# Patient Record
Sex: FEMALE | Race: WHITE | NOT HISPANIC OR LATINO
[De-identification: names, ages, dates, MRNs, and addresses within clinical notes are randomized per-mention and may not be internally consistent; named-entity substitution may affect disease eponyms.]

---

## 2017-06-06 ENCOUNTER — APPOINTMENT (OUTPATIENT)
Dept: OBGYN | Facility: CLINIC | Age: 82
End: 2017-06-06

## 2017-06-06 VITALS
BODY MASS INDEX: 28.17 KG/M2 | DIASTOLIC BLOOD PRESSURE: 86 MMHG | HEIGHT: 64 IN | WEIGHT: 165 LBS | HEART RATE: 76 BPM | SYSTOLIC BLOOD PRESSURE: 146 MMHG

## 2017-06-06 DIAGNOSIS — Z86.39 PERSONAL HISTORY OF OTHER ENDOCRINE, NUTRITIONAL AND METABOLIC DISEASE: ICD-10-CM

## 2017-06-06 DIAGNOSIS — R35.0 FREQUENCY OF MICTURITION: ICD-10-CM

## 2017-06-06 DIAGNOSIS — Z78.9 OTHER SPECIFIED HEALTH STATUS: ICD-10-CM

## 2017-06-07 LAB
BILIRUB UR QL STRIP: NEGATIVE
GLUCOSE UR-MCNC: NEGATIVE
HCG UR QL: 0.2 EU/DL
HGB UR QL STRIP.AUTO: NORMAL
KETONES UR-MCNC: NEGATIVE
LEUKOCYTE ESTERASE UR QL STRIP: NORMAL
NITRITE UR QL STRIP: NEGATIVE
PH UR STRIP: 5.5
PROT UR STRIP-MCNC: NEGATIVE
SP GR UR STRIP: 1.02

## 2017-06-09 LAB — BACTERIA UR CULT: NORMAL

## 2017-10-02 ENCOUNTER — APPOINTMENT (OUTPATIENT)
Dept: DERMATOLOGY | Facility: CLINIC | Age: 82
End: 2017-10-02

## 2017-10-13 ENCOUNTER — APPOINTMENT (OUTPATIENT)
Dept: ORTHOPEDIC SURGERY | Facility: CLINIC | Age: 82
End: 2017-10-13
Payer: MEDICARE

## 2017-10-13 VITALS
SYSTOLIC BLOOD PRESSURE: 171 MMHG | DIASTOLIC BLOOD PRESSURE: 97 MMHG | BODY MASS INDEX: 28.17 KG/M2 | HEART RATE: 72 BPM | HEIGHT: 64 IN | WEIGHT: 165 LBS

## 2017-10-13 DIAGNOSIS — Z96.651 PRESENCE OF RIGHT ARTIFICIAL KNEE JOINT: ICD-10-CM

## 2017-10-13 PROCEDURE — 73562 X-RAY EXAM OF KNEE 3: CPT | Mod: 50

## 2017-10-13 PROCEDURE — 99204 OFFICE O/P NEW MOD 45 MIN: CPT

## 2018-06-07 ENCOUNTER — RESULT REVIEW (OUTPATIENT)
Age: 83
End: 2018-06-07

## 2018-06-08 ENCOUNTER — APPOINTMENT (OUTPATIENT)
Dept: DERMATOLOGY | Facility: CLINIC | Age: 83
End: 2018-06-08
Payer: MEDICARE

## 2018-06-08 PROCEDURE — 12031 INTMD RPR S/A/T/EXT 2.5 CM/<: CPT

## 2018-06-08 PROCEDURE — 11602 EXC TR-EXT MAL+MARG 1.1-2 CM: CPT

## 2018-06-08 PROCEDURE — 99202 OFFICE O/P NEW SF 15 MIN: CPT | Mod: 25

## 2018-06-11 ENCOUNTER — APPOINTMENT (OUTPATIENT)
Dept: DERMATOLOGY | Facility: CLINIC | Age: 83
End: 2018-06-11
Payer: MEDICARE

## 2018-06-11 PROCEDURE — ZZZZZ: CPT

## 2018-06-14 ENCOUNTER — APPOINTMENT (OUTPATIENT)
Dept: DERMATOLOGY | Facility: CLINIC | Age: 83
End: 2018-06-14
Payer: MEDICARE

## 2018-06-14 PROCEDURE — ZZZZZ: CPT

## 2018-06-18 ENCOUNTER — APPOINTMENT (OUTPATIENT)
Dept: DERMATOLOGY | Facility: CLINIC | Age: 83
End: 2018-06-18
Payer: MEDICARE

## 2018-06-18 PROCEDURE — ZZZZZ: CPT

## 2018-09-07 ENCOUNTER — APPOINTMENT (OUTPATIENT)
Dept: ORTHOPEDIC SURGERY | Facility: CLINIC | Age: 83
End: 2018-09-07
Payer: MEDICARE

## 2018-09-07 VITALS
HEIGHT: 64 IN | HEART RATE: 82 BPM | SYSTOLIC BLOOD PRESSURE: 168 MMHG | WEIGHT: 165 LBS | BODY MASS INDEX: 28.17 KG/M2 | DIASTOLIC BLOOD PRESSURE: 89 MMHG

## 2018-09-07 DIAGNOSIS — M25.562 PAIN IN LEFT KNEE: ICD-10-CM

## 2018-09-07 PROCEDURE — 73562 X-RAY EXAM OF KNEE 3: CPT | Mod: LT

## 2018-09-07 PROCEDURE — 99214 OFFICE O/P EST MOD 30 MIN: CPT

## 2018-11-16 ENCOUNTER — OUTPATIENT (OUTPATIENT)
Dept: OUTPATIENT SERVICES | Facility: HOSPITAL | Age: 83
LOS: 1 days | End: 2018-11-16
Payer: MEDICARE

## 2018-11-16 VITALS
RESPIRATION RATE: 15 BRPM | WEIGHT: 151.02 LBS | TEMPERATURE: 98 F | DIASTOLIC BLOOD PRESSURE: 80 MMHG | HEART RATE: 80 BPM | HEIGHT: 66 IN | OXYGEN SATURATION: 96 % | SYSTOLIC BLOOD PRESSURE: 130 MMHG

## 2018-11-16 DIAGNOSIS — D27.9 BENIGN NEOPLASM OF UNSPECIFIED OVARY: Chronic | ICD-10-CM

## 2018-11-16 DIAGNOSIS — M17.12 UNILATERAL PRIMARY OSTEOARTHRITIS, LEFT KNEE: ICD-10-CM

## 2018-11-16 DIAGNOSIS — Z96.659 PRESENCE OF UNSPECIFIED ARTIFICIAL KNEE JOINT: Chronic | ICD-10-CM

## 2018-11-16 DIAGNOSIS — M25.562 PAIN IN LEFT KNEE: ICD-10-CM

## 2018-11-16 DIAGNOSIS — R68.89 OTHER GENERAL SYMPTOMS AND SIGNS: ICD-10-CM

## 2018-11-16 DIAGNOSIS — M40.209 UNSPECIFIED KYPHOSIS, SITE UNSPECIFIED: ICD-10-CM

## 2018-11-16 DIAGNOSIS — Z01.818 ENCOUNTER FOR OTHER PREPROCEDURAL EXAMINATION: ICD-10-CM

## 2018-11-16 LAB
ALBUMIN SERPL ELPH-MCNC: 3.7 G/DL — SIGNIFICANT CHANGE UP (ref 3.3–5)
ALP SERPL-CCNC: 84 U/L — SIGNIFICANT CHANGE UP (ref 30–120)
ALT FLD-CCNC: 26 U/L DA — SIGNIFICANT CHANGE UP (ref 10–60)
ANION GAP SERPL CALC-SCNC: 8 MMOL/L — SIGNIFICANT CHANGE UP (ref 5–17)
APTT BLD: 29.6 SEC — SIGNIFICANT CHANGE UP (ref 28.5–37)
AST SERPL-CCNC: 21 U/L — SIGNIFICANT CHANGE UP (ref 10–40)
BILIRUB SERPL-MCNC: 0.6 MG/DL — SIGNIFICANT CHANGE UP (ref 0.2–1.2)
BUN SERPL-MCNC: 12 MG/DL — SIGNIFICANT CHANGE UP (ref 7–23)
CALCIUM SERPL-MCNC: 9.1 MG/DL — SIGNIFICANT CHANGE UP (ref 8.4–10.5)
CHLORIDE SERPL-SCNC: 105 MMOL/L — SIGNIFICANT CHANGE UP (ref 96–108)
CO2 SERPL-SCNC: 30 MMOL/L — SIGNIFICANT CHANGE UP (ref 22–31)
CREAT SERPL-MCNC: 0.65 MG/DL — SIGNIFICANT CHANGE UP (ref 0.5–1.3)
GLUCOSE SERPL-MCNC: 100 MG/DL — HIGH (ref 70–99)
HCT VFR BLD CALC: 42.5 % — SIGNIFICANT CHANGE UP (ref 34.5–45)
HGB BLD-MCNC: 13.6 G/DL — SIGNIFICANT CHANGE UP (ref 11.5–15.5)
INR BLD: 1.07 RATIO — SIGNIFICANT CHANGE UP (ref 0.88–1.16)
MCHC RBC-ENTMCNC: 28.9 PG — SIGNIFICANT CHANGE UP (ref 27–34)
MCHC RBC-ENTMCNC: 32 GM/DL — SIGNIFICANT CHANGE UP (ref 32–36)
MCV RBC AUTO: 90.2 FL — SIGNIFICANT CHANGE UP (ref 80–100)
MRSA PCR RESULT.: SIGNIFICANT CHANGE UP
NRBC # BLD: 0 /100 WBCS — SIGNIFICANT CHANGE UP (ref 0–0)
PLATELET # BLD AUTO: 185 K/UL — SIGNIFICANT CHANGE UP (ref 150–400)
POTASSIUM SERPL-MCNC: 3.9 MMOL/L — SIGNIFICANT CHANGE UP (ref 3.5–5.3)
POTASSIUM SERPL-SCNC: 3.9 MMOL/L — SIGNIFICANT CHANGE UP (ref 3.5–5.3)
PROT SERPL-MCNC: 7.3 G/DL — SIGNIFICANT CHANGE UP (ref 6–8.3)
PROTHROM AB SERPL-ACNC: 11.7 SEC — SIGNIFICANT CHANGE UP (ref 10–12.9)
RBC # BLD: 4.71 M/UL — SIGNIFICANT CHANGE UP (ref 3.8–5.2)
RBC # FLD: 13.7 % — SIGNIFICANT CHANGE UP (ref 10.3–14.5)
S AUREUS DNA NOSE QL NAA+PROBE: SIGNIFICANT CHANGE UP
SODIUM SERPL-SCNC: 143 MMOL/L — SIGNIFICANT CHANGE UP (ref 135–145)
WBC # BLD: 6.12 K/UL — SIGNIFICANT CHANGE UP (ref 3.8–10.5)
WBC # FLD AUTO: 6.12 K/UL — SIGNIFICANT CHANGE UP (ref 3.8–10.5)

## 2018-11-16 PROCEDURE — 36415 COLL VENOUS BLD VENIPUNCTURE: CPT

## 2018-11-16 PROCEDURE — 86901 BLOOD TYPING SEROLOGIC RH(D): CPT

## 2018-11-16 PROCEDURE — 85610 PROTHROMBIN TIME: CPT

## 2018-11-16 PROCEDURE — 87640 STAPH A DNA AMP PROBE: CPT

## 2018-11-16 PROCEDURE — G0463: CPT

## 2018-11-16 PROCEDURE — 86850 RBC ANTIBODY SCREEN: CPT

## 2018-11-16 PROCEDURE — 85730 THROMBOPLASTIN TIME PARTIAL: CPT

## 2018-11-16 PROCEDURE — 85027 COMPLETE CBC AUTOMATED: CPT

## 2018-11-16 PROCEDURE — 87641 MR-STAPH DNA AMP PROBE: CPT

## 2018-11-16 PROCEDURE — 80053 COMPREHEN METABOLIC PANEL: CPT

## 2018-11-16 PROCEDURE — 86900 BLOOD TYPING SEROLOGIC ABO: CPT

## 2018-11-16 RX ORDER — LEVOTHYROXINE SODIUM 125 MCG
1 TABLET ORAL
Qty: 0 | Refills: 0 | COMMUNITY

## 2018-11-16 RX ORDER — ASPIRIN/CALCIUM CARB/MAGNESIUM 324 MG
1 TABLET ORAL
Qty: 0 | Refills: 0 | COMMUNITY

## 2018-11-16 NOTE — H&P PST ADULT - HISTORY OF PRESENT ILLNESS
this is a 85 y/o female who has had pain left knee for about a year; unknown test results; to have left total knee replacement; occasionally takes  mg for pain

## 2018-11-24 NOTE — H&P PST ADULT - MAMMOGRAM, LAST, PROFILE
Implemented All Fall Risk Interventions:  [place] to call system. Call bell, personal items and telephone within reach. Instruct patient to call for assistance. Room bathroom lighting operational. Non-slip footwear when patient is off stretcher. Physically safe environment: no spills, clutter or unnecessary equipment. Stretcher in lowest position, wheels locked, appropriate side rails in place. Provide visual cue, wrist band, yellow gown, etc. Monitor gait and stability. Monitor for mental status changes and reorient to person, place, and time. Review medications for side effects contributing to fall risk. Reinforce activity limits and safety measures with patient and family.
2017

## 2018-12-04 PROBLEM — M19.90 UNSPECIFIED OSTEOARTHRITIS, UNSPECIFIED SITE: Chronic | Status: ACTIVE | Noted: 2018-11-16

## 2018-12-04 PROBLEM — M40.209 UNSPECIFIED KYPHOSIS, SITE UNSPECIFIED: Chronic | Status: ACTIVE | Noted: 2018-11-16

## 2018-12-04 RX ORDER — POLYETHYLENE GLYCOL 3350 17 G/17G
17 POWDER, FOR SOLUTION ORAL DAILY
Qty: 0 | Refills: 0 | Status: DISCONTINUED | OUTPATIENT
Start: 2018-12-05 | End: 2018-12-08

## 2018-12-04 RX ORDER — SENNA PLUS 8.6 MG/1
2 TABLET ORAL AT BEDTIME
Qty: 0 | Refills: 0 | Status: DISCONTINUED | OUTPATIENT
Start: 2018-12-05 | End: 2018-12-08

## 2018-12-04 RX ORDER — SODIUM CHLORIDE 9 MG/ML
1000 INJECTION, SOLUTION INTRAVENOUS
Qty: 0 | Refills: 0 | Status: DISCONTINUED | OUTPATIENT
Start: 2018-12-05 | End: 2018-12-08

## 2018-12-04 RX ORDER — PANTOPRAZOLE SODIUM 20 MG/1
40 TABLET, DELAYED RELEASE ORAL
Qty: 0 | Refills: 0 | Status: DISCONTINUED | OUTPATIENT
Start: 2018-12-05 | End: 2018-12-08

## 2018-12-04 RX ORDER — MAGNESIUM HYDROXIDE 400 MG/1
30 TABLET, CHEWABLE ORAL DAILY
Qty: 0 | Refills: 0 | Status: DISCONTINUED | OUTPATIENT
Start: 2018-12-05 | End: 2018-12-08

## 2018-12-04 RX ORDER — ONDANSETRON 8 MG/1
4 TABLET, FILM COATED ORAL EVERY 6 HOURS
Qty: 0 | Refills: 0 | Status: DISCONTINUED | OUTPATIENT
Start: 2018-12-05 | End: 2018-12-08

## 2018-12-04 RX ORDER — DOCUSATE SODIUM 100 MG
100 CAPSULE ORAL THREE TIMES A DAY
Qty: 0 | Refills: 0 | Status: DISCONTINUED | OUTPATIENT
Start: 2018-12-05 | End: 2018-12-08

## 2018-12-05 ENCOUNTER — APPOINTMENT (OUTPATIENT)
Dept: ORTHOPEDIC SURGERY | Facility: HOSPITAL | Age: 83
End: 2018-12-05

## 2018-12-05 ENCOUNTER — RESULT REVIEW (OUTPATIENT)
Age: 83
End: 2018-12-05

## 2018-12-05 ENCOUNTER — INPATIENT (INPATIENT)
Facility: HOSPITAL | Age: 83
LOS: 2 days | Discharge: NURSING, REHAB OR LONG-TERM CARE | DRG: 470 | End: 2018-12-08
Attending: ORTHOPAEDIC SURGERY | Admitting: ORTHOPAEDIC SURGERY
Payer: MEDICARE

## 2018-12-05 VITALS
RESPIRATION RATE: 15 BRPM | HEART RATE: 88 BPM | DIASTOLIC BLOOD PRESSURE: 97 MMHG | HEIGHT: 59 IN | SYSTOLIC BLOOD PRESSURE: 160 MMHG | WEIGHT: 144.4 LBS | TEMPERATURE: 98 F

## 2018-12-05 DIAGNOSIS — Z96.659 PRESENCE OF UNSPECIFIED ARTIFICIAL KNEE JOINT: Chronic | ICD-10-CM

## 2018-12-05 DIAGNOSIS — M17.12 UNILATERAL PRIMARY OSTEOARTHRITIS, LEFT KNEE: ICD-10-CM

## 2018-12-05 DIAGNOSIS — Z01.818 ENCOUNTER FOR OTHER PREPROCEDURAL EXAMINATION: ICD-10-CM

## 2018-12-05 DIAGNOSIS — R33.9 RETENTION OF URINE, UNSPECIFIED: ICD-10-CM

## 2018-12-05 DIAGNOSIS — E03.9 HYPOTHYROIDISM, UNSPECIFIED: ICD-10-CM

## 2018-12-05 DIAGNOSIS — R07.9 CHEST PAIN, UNSPECIFIED: ICD-10-CM

## 2018-12-05 DIAGNOSIS — D27.9 BENIGN NEOPLASM OF UNSPECIFIED OVARY: Chronic | ICD-10-CM

## 2018-12-05 LAB
ANION GAP SERPL CALC-SCNC: 9 MMOL/L — SIGNIFICANT CHANGE UP (ref 5–17)
BUN SERPL-MCNC: 12 MG/DL — SIGNIFICANT CHANGE UP (ref 7–23)
CALCIUM SERPL-MCNC: 8.4 MG/DL — SIGNIFICANT CHANGE UP (ref 8.4–10.5)
CHLORIDE SERPL-SCNC: 101 MMOL/L — SIGNIFICANT CHANGE UP (ref 96–108)
CO2 SERPL-SCNC: 26 MMOL/L — SIGNIFICANT CHANGE UP (ref 22–31)
CREAT SERPL-MCNC: 0.72 MG/DL — SIGNIFICANT CHANGE UP (ref 0.5–1.3)
GLUCOSE SERPL-MCNC: 185 MG/DL — HIGH (ref 70–99)
HCT VFR BLD CALC: 38.8 % — SIGNIFICANT CHANGE UP (ref 34.5–45)
HGB BLD-MCNC: 12.6 G/DL — SIGNIFICANT CHANGE UP (ref 11.5–15.5)
POTASSIUM SERPL-MCNC: 4.6 MMOL/L — SIGNIFICANT CHANGE UP (ref 3.5–5.3)
POTASSIUM SERPL-SCNC: 4.6 MMOL/L — SIGNIFICANT CHANGE UP (ref 3.5–5.3)
SODIUM SERPL-SCNC: 136 MMOL/L — SIGNIFICANT CHANGE UP (ref 135–145)
TROPONIN I SERPL-MCNC: 0.01 NG/ML — LOW (ref 0.02–0.06)

## 2018-12-05 PROCEDURE — 88311 DECALCIFY TISSUE: CPT | Mod: 26

## 2018-12-05 PROCEDURE — 73562 X-RAY EXAM OF KNEE 3: CPT | Mod: 26,LT

## 2018-12-05 PROCEDURE — 88305 TISSUE EXAM BY PATHOLOGIST: CPT | Mod: 26

## 2018-12-05 PROCEDURE — 71045 X-RAY EXAM CHEST 1 VIEW: CPT | Mod: 26

## 2018-12-05 PROCEDURE — 27447 TOTAL KNEE ARTHROPLASTY: CPT | Mod: LT

## 2018-12-05 PROCEDURE — 93010 ELECTROCARDIOGRAM REPORT: CPT

## 2018-12-05 PROCEDURE — 99223 1ST HOSP IP/OBS HIGH 75: CPT

## 2018-12-05 PROCEDURE — 20985 CPTR-ASST DIR MS PX: CPT | Mod: LT

## 2018-12-05 PROCEDURE — 27447 TOTAL KNEE ARTHROPLASTY: CPT | Mod: AS,LT

## 2018-12-05 RX ORDER — CHLORHEXIDINE GLUCONATE 213 G/1000ML
1 SOLUTION TOPICAL ONCE
Qty: 0 | Refills: 0 | Status: COMPLETED | OUTPATIENT
Start: 2018-12-05 | End: 2018-12-05

## 2018-12-05 RX ORDER — LEVOTHYROXINE SODIUM 125 MCG
25 TABLET ORAL
Qty: 0 | Refills: 0 | Status: DISCONTINUED | OUTPATIENT
Start: 2018-12-06 | End: 2018-12-08

## 2018-12-05 RX ORDER — ACETAMINOPHEN 500 MG
1000 TABLET ORAL ONCE
Qty: 0 | Refills: 0 | Status: COMPLETED | OUTPATIENT
Start: 2018-12-05 | End: 2018-12-05

## 2018-12-05 RX ORDER — LEVOTHYROXINE SODIUM 125 MCG
25 TABLET ORAL DAILY
Qty: 0 | Refills: 0 | Status: DISCONTINUED | OUTPATIENT
Start: 2018-12-05 | End: 2018-12-05

## 2018-12-05 RX ORDER — ACETAMINOPHEN 500 MG
1000 TABLET ORAL EVERY 8 HOURS
Qty: 0 | Refills: 0 | Status: DISCONTINUED | OUTPATIENT
Start: 2018-12-06 | End: 2018-12-08

## 2018-12-05 RX ORDER — CEFAZOLIN SODIUM 1 G
2000 VIAL (EA) INJECTION ONCE
Qty: 0 | Refills: 0 | Status: COMPLETED | OUTPATIENT
Start: 2018-12-05 | End: 2018-12-05

## 2018-12-05 RX ORDER — APREPITANT 80 MG/1
40 CAPSULE ORAL ONCE
Qty: 0 | Refills: 0 | Status: COMPLETED | OUTPATIENT
Start: 2018-12-05 | End: 2018-12-05

## 2018-12-05 RX ORDER — LEVOTHYROXINE SODIUM 125 MCG
50 TABLET ORAL
Qty: 0 | Refills: 0 | Status: DISCONTINUED | OUTPATIENT
Start: 2018-12-07 | End: 2018-12-08

## 2018-12-05 RX ORDER — OXYCODONE HYDROCHLORIDE 5 MG/1
10 TABLET ORAL
Qty: 0 | Refills: 0 | Status: DISCONTINUED | OUTPATIENT
Start: 2018-12-05 | End: 2018-12-06

## 2018-12-05 RX ORDER — HYDROMORPHONE HYDROCHLORIDE 2 MG/ML
0.5 INJECTION INTRAMUSCULAR; INTRAVENOUS; SUBCUTANEOUS
Qty: 0 | Refills: 0 | Status: DISCONTINUED | OUTPATIENT
Start: 2018-12-05 | End: 2018-12-08

## 2018-12-05 RX ORDER — OXYCODONE HYDROCHLORIDE 5 MG/1
5 TABLET ORAL
Qty: 0 | Refills: 0 | Status: DISCONTINUED | OUTPATIENT
Start: 2018-12-05 | End: 2018-12-06

## 2018-12-05 RX ORDER — TRANEXAMIC ACID 100 MG/ML
1000 INJECTION, SOLUTION INTRAVENOUS ONCE
Qty: 0 | Refills: 0 | Status: COMPLETED | OUTPATIENT
Start: 2018-12-05 | End: 2018-12-05

## 2018-12-05 RX ORDER — ASPIRIN/CALCIUM CARB/MAGNESIUM 324 MG
81 TABLET ORAL EVERY 12 HOURS
Qty: 0 | Refills: 0 | Status: DISCONTINUED | OUTPATIENT
Start: 2018-12-06 | End: 2018-12-06

## 2018-12-05 RX ORDER — HYDROMORPHONE HYDROCHLORIDE 2 MG/ML
0.5 INJECTION INTRAMUSCULAR; INTRAVENOUS; SUBCUTANEOUS
Qty: 0 | Refills: 0 | Status: DISCONTINUED | OUTPATIENT
Start: 2018-12-05 | End: 2018-12-05

## 2018-12-05 RX ORDER — ONDANSETRON 8 MG/1
4 TABLET, FILM COATED ORAL ONCE
Qty: 0 | Refills: 0 | Status: DISCONTINUED | OUTPATIENT
Start: 2018-12-05 | End: 2018-12-05

## 2018-12-05 RX ORDER — LEVOTHYROXINE SODIUM 125 MCG
50 TABLET ORAL DAILY
Qty: 0 | Refills: 0 | Status: DISCONTINUED | OUTPATIENT
Start: 2018-12-05 | End: 2018-12-05

## 2018-12-05 RX ORDER — ACETAMINOPHEN 500 MG
1000 TABLET ORAL EVERY 6 HOURS
Qty: 0 | Refills: 0 | Status: COMPLETED | OUTPATIENT
Start: 2018-12-05 | End: 2018-12-06

## 2018-12-05 RX ORDER — SODIUM CHLORIDE 9 MG/ML
1000 INJECTION, SOLUTION INTRAVENOUS
Qty: 0 | Refills: 0 | Status: DISCONTINUED | OUTPATIENT
Start: 2018-12-05 | End: 2018-12-05

## 2018-12-05 RX ORDER — CEFAZOLIN SODIUM 1 G
2000 VIAL (EA) INJECTION EVERY 8 HOURS
Qty: 0 | Refills: 0 | Status: COMPLETED | OUTPATIENT
Start: 2018-12-05 | End: 2018-12-06

## 2018-12-05 RX ADMIN — CHLORHEXIDINE GLUCONATE 1 APPLICATION(S): 213 SOLUTION TOPICAL at 12:56

## 2018-12-05 RX ADMIN — Medication 1000 MILLIGRAM(S): at 23:30

## 2018-12-05 RX ADMIN — APREPITANT 40 MILLIGRAM(S): 80 CAPSULE ORAL at 12:55

## 2018-12-05 RX ADMIN — SODIUM CHLORIDE 75 MILLILITER(S): 9 INJECTION, SOLUTION INTRAVENOUS at 19:11

## 2018-12-05 RX ADMIN — Medication 400 MILLIGRAM(S): at 22:52

## 2018-12-05 RX ADMIN — Medication 100 MILLIGRAM(S): at 23:14

## 2018-12-05 NOTE — CONSULT NOTE ADULT - PROBLEM SELECTOR RECOMMENDATION 9
Pain Management: acceptable- continue current care Tylenol ATC/Celebrex ATC/ Oxycodone PRN  PT/OT - on  hold for chest pain evaluation  DVT proph: [ x ] low risk - Aspirin  [  ] high risk -Lovenox [  ] high risk - Eliquis [  ] other:_________  DC plan:  [  ] Home with HC  [  ] Rehab   [ x ] TBD  [  ]other:___________

## 2018-12-05 NOTE — CONSULT NOTE ADULT - PROBLEM SELECTOR RECOMMENDATION 3
anxiety over urination may be increasing anxiety level and chest pain  will place flores for now and evaluate for DC in AM.

## 2018-12-05 NOTE — CHART NOTE - NSCHARTNOTEFT_GEN_A_CORE
F/u on chest pain, seen & examined at the bedside, AAO x3, chest pain free, no SOB, Dizziness or nausea, stating that she is fine now, O/E no distress, lying down supine 30 degrees with no orthopnea, no JVD, no pedal edema, good DPA & PTA pulsations B/L, no carotid bruit, Heart: normal S1, acc A2, grade I/VI MINDI over 1st aortic area with no propagation, no extra sounds, Lungs: good air entry B/L equal, no adventitious sounds, Abdomen: soft NT, ND, BS(+), no palpable masses or organomegaly. EKG X2 showed no evidence suggestive of ischemia, troponin negative. Patient is going to be transferred to [facility] with remote tele for continuous monitoring. F/u on chest pain, seen & examined at the bedside, AAO x3, chest pain free, no SOB, Dizziness or nausea, stating that she is fine now, O/E no distress, lying down supine 30 degrees with no orthopnea, no JVD, no pedal edema, good DPA & PTA pulsations B/L, no carotid bruit, Heart: normal S1, acc A2, grade I/VI MINDI over 1st aortic area with no propagation, no extra sounds, Lungs: good air entry B/L equal, no adventitious sounds, Abdomen: soft NT, ND, BS(+), no palpable masses or organomegaly. EKG X2 showed no evidence suggestive of ischemia, troponin negative. Chest X ray as per my review shows poor inspiratory effort, right axillary shadow possibly a calcified axillary LN, normal cardiac shadow size, aortic arch calcifications, clear lung fields B/L, no pulmonary infiltrates, pleural effusion, or pneumothorax. Patient is going to be transferred to [facility] with remote tele for continuous monitoring.

## 2018-12-05 NOTE — BRIEF OPERATIVE NOTE - PROCEDURE
<<-----Click on this checkbox to enter Procedure Arthroplasty of knee  12/05/2018    Active  BLORUSSO

## 2018-12-05 NOTE — CONSULT NOTE ADULT - SUBJECTIVE AND OBJECTIVE BOX
Patient is a 84y old  Female who presents with a chief complaint of left knee pain    HPI: 85 y/o female who has had pain left knee for about a year affecting her ambulation.  She is now s/p left TKR.    In PACU patient is complaining about having sharp left chest pain.  She cannot describe it further.  No increase with deep breathing, denies SOB, dyspnea, nausea or other associated symptoms.   Also, she feels like her bladder is full and unable to urinate.  Bladder scan showed >500cc of urine.        REVIEW OF SYSTEMS:  CONSTITUTIONAL: No fever, weight loss, or fatigue  EYES: No eye pain, visual disturbances, or discharge  ENMT:  No difficulty hearing, tinnitus, vertigo; No sinus or throat pain  NECK: No pain or stiffness  BREASTS: No pain, masses, or nipple discharge  RESPIRATORY: No cough, wheezing, chills or hemoptysis; No shortness of breath  CARDIOVASCULAR: see hpi  GASTROINTESTINAL: No abdominal or epigastric pain. No nausea, vomiting, or hematemesis; No diarrhea or constipation. No melena or hematochezia.  GENITOURINARY: see hpi  NEUROLOGICAL: No headaches, memory loss, loss of strength, numbness, or tremors  SKIN: No itching, burning, rashes, or lesions   LYMPH NODES: No enlarged glands  ENDOCRINE: No heat or cold intolerance; No hair loss  MUSCULOSKELETAL: No muscle or back pain  PSYCHIATRIC: No depression, anxiety, mood swings, or difficulty sleeping  HEME/LYMPH: No easy bruising, or bleeding gums  ALLERGY AND IMMUNOLOGIC: No hives or eczema    PAST MEDICAL & SURGICAL HISTORY:  Kyphosis  Osteoarthritis  Hypothyroidism  S/P knee replacement: right knee 11/12  Benign ovarian tumor: removed 1997      SOCIAL HISTORY:  Residence: [ ] [facility]  [ ] SNF  [x ] Community  [ ] Substance abuse: denies  [ ] Tobacco: denies  [ ] Alcohol use: denies    Allergies  cortisone (Eye Irritation)  latex (Rash)    MEDICATIONS  (STANDING):  lactated ringers. 1000 milliLiter(s) (75 mL/Hr) IV Continuous <Continuous>  levothyroxine 25 MICROGram(s) Oral daily  levothyroxine 50 MICROGram(s) Oral daily    MEDICATIONS  (PRN):  HYDROmorphone  Injectable 0.5 milliGRAM(s) IV Push every 10 minutes PRN Moderate Pain (4 - 6)  ondansetron Injectable 4 milliGRAM(s) IV Push once PRN Nausea and/or Vomiting      FAMILY HISTORY:  Family history of stroke (Mother)      Vital Signs Last 24 Hrs  T(C): 36.6 (05 Dec 2018 12:47), Max: 36.6 (05 Dec 2018 12:47)  T(F): 97.8 (05 Dec 2018 12:47), Max: 97.8 (05 Dec 2018 12:47)  HR: 88 (05 Dec 2018 12:47) (88 - 88)  BP: 160/97 (05 Dec 2018 12:47) (160/97 - 160/97)  BP(mean): --  RR: 15 (05 Dec 2018 12:47) (15 - 15)  SpO2: --    PHYSICAL EXAM:    GENERAL: NAD, well-groomed, well-developed  HEAD:  Atraumatic, Normocephalic  EYES: EOMI, PERRLA, conjunctiva and sclera clear  ENMT:  Moist mucous membranes  NECK: Supple, No JVD  NERVOUS SYSTEM:  Alert & Oriented X3, Good concentration; Moving all 4 extremities; No gross sensory deficits  CHEST/LUNG: Clear to auscultation bilaterally; No rales, rhonchi, wheezing, or rubs  HEART: Regular rate and rhythm; No murmurs, rubs, or gallops; No reproducible pain  ABDOMEN: Soft, Nontender,  mildly distended; Bowel sounds present  EXTREMITIES:  2+ Peripheral Pulses, No clubbing, cyanosis, or edema  LYMPH: No lymphadenopathy noted  /RECTAL: Not examined  BREAST: Not examined  SKIN: No rashes or lesions  INCISION: ACE wrap intact    LABS:    CAPILLARY BLOOD GLUCOSE    RADIOLOGY & ADDITIONAL STUDIES:    EKG: pre-op EKG showed  NSR with first deg AV block;  unchanged today.   Personally Reviewed:  [x ] YES     Imaging: TKR in place  Personally Reviewed:  [x ] YES     Consultant(s) Notes Reviewed:  preop med and cardiac clearance reviewed.    Care Discussed with Consultants/Other Providers: nursing, PT

## 2018-12-05 NOTE — CONSULT NOTE ADULT - PROBLEM SELECTOR RECOMMENDATION 2
diff dx is broad - ACS, MSK, Gastritis/GERD, anxiety  check CXR, EKG, serial trop;  remote tele on 2w    Pre-op cardiac eval echo oct 30 showed preserved lv function with mild valvular insufficiency  nuc stress on 11/14 did not show clear evidence of ischemia

## 2018-12-06 ENCOUNTER — APPOINTMENT (OUTPATIENT)
Dept: DERMATOLOGY | Facility: CLINIC | Age: 83
End: 2018-12-06

## 2018-12-06 LAB
ANION GAP SERPL CALC-SCNC: 7 MMOL/L — SIGNIFICANT CHANGE UP (ref 5–17)
BUN SERPL-MCNC: 11 MG/DL — SIGNIFICANT CHANGE UP (ref 7–23)
CALCIUM SERPL-MCNC: 8.4 MG/DL — SIGNIFICANT CHANGE UP (ref 8.4–10.5)
CHLORIDE SERPL-SCNC: 102 MMOL/L — SIGNIFICANT CHANGE UP (ref 96–108)
CO2 SERPL-SCNC: 31 MMOL/L — SIGNIFICANT CHANGE UP (ref 22–31)
CREAT SERPL-MCNC: 0.72 MG/DL — SIGNIFICANT CHANGE UP (ref 0.5–1.3)
GLUCOSE SERPL-MCNC: 126 MG/DL — HIGH (ref 70–99)
HBA1C BLD-MCNC: 6.1 % — HIGH (ref 4–5.6)
HCT VFR BLD CALC: 35.6 % — SIGNIFICANT CHANGE UP (ref 34.5–45)
HGB BLD-MCNC: 11.7 G/DL — SIGNIFICANT CHANGE UP (ref 11.5–15.5)
MCHC RBC-ENTMCNC: 29.8 PG — SIGNIFICANT CHANGE UP (ref 27–34)
MCHC RBC-ENTMCNC: 32.9 GM/DL — SIGNIFICANT CHANGE UP (ref 32–36)
MCV RBC AUTO: 90.8 FL — SIGNIFICANT CHANGE UP (ref 80–100)
NRBC # BLD: 0 /100 WBCS — SIGNIFICANT CHANGE UP (ref 0–0)
PLATELET # BLD AUTO: 128 K/UL — LOW (ref 150–400)
POTASSIUM SERPL-MCNC: 4.1 MMOL/L — SIGNIFICANT CHANGE UP (ref 3.5–5.3)
POTASSIUM SERPL-SCNC: 4.1 MMOL/L — SIGNIFICANT CHANGE UP (ref 3.5–5.3)
RBC # BLD: 3.92 M/UL — SIGNIFICANT CHANGE UP (ref 3.8–5.2)
RBC # FLD: 13.6 % — SIGNIFICANT CHANGE UP (ref 10.3–14.5)
SODIUM SERPL-SCNC: 140 MMOL/L — SIGNIFICANT CHANGE UP (ref 135–145)
TROPONIN I SERPL-MCNC: 0.03 NG/ML — SIGNIFICANT CHANGE UP (ref 0.02–0.06)
WBC # BLD: 7.29 K/UL — SIGNIFICANT CHANGE UP (ref 3.8–10.5)
WBC # FLD AUTO: 7.29 K/UL — SIGNIFICANT CHANGE UP (ref 3.8–10.5)

## 2018-12-06 PROCEDURE — 99233 SBSQ HOSP IP/OBS HIGH 50: CPT

## 2018-12-06 RX ORDER — APIXABAN 2.5 MG/1
2.5 TABLET, FILM COATED ORAL
Qty: 0 | Refills: 0 | Status: DISCONTINUED | OUTPATIENT
Start: 2018-12-06 | End: 2018-12-06

## 2018-12-06 RX ORDER — TRAMADOL HYDROCHLORIDE 50 MG/1
25 TABLET ORAL EVERY 4 HOURS
Qty: 0 | Refills: 0 | Status: DISCONTINUED | OUTPATIENT
Start: 2018-12-06 | End: 2018-12-07

## 2018-12-06 RX ORDER — APIXABAN 2.5 MG/1
2.5 TABLET, FILM COATED ORAL EVERY 12 HOURS
Qty: 0 | Refills: 0 | Status: DISCONTINUED | OUTPATIENT
Start: 2018-12-06 | End: 2018-12-08

## 2018-12-06 RX ORDER — CELECOXIB 200 MG/1
200 CAPSULE ORAL
Qty: 0 | Refills: 0 | Status: DISCONTINUED | OUTPATIENT
Start: 2018-12-06 | End: 2018-12-08

## 2018-12-06 RX ORDER — TRAMADOL HYDROCHLORIDE 50 MG/1
50 TABLET ORAL EVERY 4 HOURS
Qty: 0 | Refills: 0 | Status: DISCONTINUED | OUTPATIENT
Start: 2018-12-06 | End: 2018-12-07

## 2018-12-06 RX ORDER — ASPIRIN/CALCIUM CARB/MAGNESIUM 324 MG
81 TABLET ORAL DAILY
Qty: 0 | Refills: 0 | Status: DISCONTINUED | OUTPATIENT
Start: 2018-12-06 | End: 2018-12-06

## 2018-12-06 RX ADMIN — TRAMADOL HYDROCHLORIDE 25 MILLIGRAM(S): 50 TABLET ORAL at 14:02

## 2018-12-06 RX ADMIN — PANTOPRAZOLE SODIUM 40 MILLIGRAM(S): 20 TABLET, DELAYED RELEASE ORAL at 05:13

## 2018-12-06 RX ADMIN — OXYCODONE HYDROCHLORIDE 5 MILLIGRAM(S): 5 TABLET ORAL at 10:35

## 2018-12-06 RX ADMIN — TRAMADOL HYDROCHLORIDE 25 MILLIGRAM(S): 50 TABLET ORAL at 22:51

## 2018-12-06 RX ADMIN — Medication 400 MILLIGRAM(S): at 10:35

## 2018-12-06 RX ADMIN — OXYCODONE HYDROCHLORIDE 5 MILLIGRAM(S): 5 TABLET ORAL at 11:20

## 2018-12-06 RX ADMIN — Medication 25 MICROGRAM(S): at 05:27

## 2018-12-06 RX ADMIN — APIXABAN 2.5 MILLIGRAM(S): 2.5 TABLET, FILM COATED ORAL at 20:40

## 2018-12-06 RX ADMIN — TRAMADOL HYDROCHLORIDE 25 MILLIGRAM(S): 50 TABLET ORAL at 14:45

## 2018-12-06 RX ADMIN — OXYCODONE HYDROCHLORIDE 5 MILLIGRAM(S): 5 TABLET ORAL at 08:30

## 2018-12-06 RX ADMIN — Medication 1000 MILLIGRAM(S): at 05:38

## 2018-12-06 RX ADMIN — TRAMADOL HYDROCHLORIDE 25 MILLIGRAM(S): 50 TABLET ORAL at 23:21

## 2018-12-06 RX ADMIN — ONDANSETRON 4 MILLIGRAM(S): 8 TABLET, FILM COATED ORAL at 10:35

## 2018-12-06 RX ADMIN — Medication 400 MILLIGRAM(S): at 05:14

## 2018-12-06 RX ADMIN — Medication 100 MILLIGRAM(S): at 05:13

## 2018-12-06 RX ADMIN — Medication 81 MILLIGRAM(S): at 08:30

## 2018-12-06 RX ADMIN — Medication 100 MILLIGRAM(S): at 07:22

## 2018-12-06 RX ADMIN — Medication 1000 MILLIGRAM(S): at 11:00

## 2018-12-06 RX ADMIN — OXYCODONE HYDROCHLORIDE 5 MILLIGRAM(S): 5 TABLET ORAL at 09:00

## 2018-12-06 NOTE — PROGRESS NOTE ADULT - SUBJECTIVE AND OBJECTIVE BOX
Orthopedic P.A.- POD# 1 - s/p Left TKR    Patient alert and c/o worsening pain in her knee after oxycodone 5mg and nausea while now sitting in chair.  Patient is alert and oriented at this time (apparently 'sundowned' last night, as per nurse).    Vital Signs Last 24 Hrs  T(C): 36.3 (06 Dec 2018 08:12), Max: 36.9 (06 Dec 2018 03:20)  T(F): 97.3 (06 Dec 2018 08:12), Max: 98.5 (06 Dec 2018 03:20)  HR: 85 (06 Dec 2018 08:40) (78 - 93)  BP: 104/74 (06 Dec 2018 08:40) (104/74 - 160/97)  BP(mean): --  RR: 18 (06 Dec 2018 08:12) (11 - 19)  SpO2: 93% (06 Dec 2018 08:40) (93% - 100%)         I&O's Detail    05 Dec 2018 07:01  -  06 Dec 2018 07:00  --------------------------------------------------------  IN:    lactated ringers.: 1000 mL    Oral Fluid: 450 mL  Total IN: 1450 mL    OUT:    Accordian drain left knee: 135 mL over 12 hours.    Estimated Blood Loss: 150 mL    Indwelling Catheter - Urethral: 2250 mL (inserted in PACU yesterday for retention)  Total OUT: 2535 mL    Total NET: -1085 mL        Labs:                                                                11.7   7.29  )-----------( 128<L>    ( 06 Dec 2018 05:57 )             35.6   06 Dec 2018 05:57                                06 Dec 2018 05:57    140    |  102    |  11     ----------------------------<  126<H>  4.1     |  31     |  0.72     Ca    8.4        06 Dec 2018 05:57        MEDICATIONS:acetaminophen   Tablet .. 1000 milliGRAM(s) Oral every 8 hours  aluminum hydroxide/magnesium hydroxide/simethicone Suspension 30 milliLiter(s) Oral four times a day PRN  aspirin enteric coated 81 milliGRAM(s) Oral every 12 hours  celecoxib 200 milliGRAM(s) Oral two times a day  docusate sodium 100 milliGRAM(s) Oral three times a day  HYDROmorphone  Injectable 0.5 milliGRAM(s) IV Push every 3 hours PRN  lactated ringers. 1000 milliLiter(s) IV Continuous <Continuous>  levothyroxine 25 MICROGram(s) Oral <User Schedule>  magnesium hydroxide Suspension 30 milliLiter(s) Oral daily PRN  ondansetron Injectable 4 milliGRAM(s) IV Push every 6 hours PRN  oxyCODONE    IR 5 milliGRAM(s) Oral every 3 hours PRN  oxyCODONE    IR 10 milliGRAM(s) Oral every 3 hours PRN  pantoprazole    Tablet 40 milliGRAM(s) Oral before breakfast  polyethylene glycol 3350 17 Gram(s) Oral daily PRN  senna 2 Tablet(s) Oral at bedtime    Anticoagulation:  aspirin enteric coated 81 milliGRAM(s) Oral every 12 hours started this morning      Antibiotics: complete      Pain medications:   acetaminophen   Tablet .. 1000 milliGRAM(s) Oral every 8 hours  celecoxib 200 milliGRAM(s) Oral two times a day  HYDROmorphone  Injectable 0.5 milliGRAM(s) IV Push every 3 hours PRN  ondansetron Injectable 4 milliGRAM(s) IV Push every 6 hours PRN  oxyCODONE    IR 5 milliGRAM(s) Oral every 3 hours PRN  oxyCODONE    IR 10 milliGRAM(s) Oral every 3 hours PRN                                      Physical Exam:  Left knee- Primary surgical bandage dry and intact.  Hemovac drain patent.  Neurovascular grossly intact LE's.  PAS on LE's.  Calves soft and non-tender.                                                                                                                                                          A/P:  Orthopedically stable.  -Continue pain management with above plan (patient requesting additional oxycodone; NOT a change in narcotic);  Zofran given for nausea  -DVT prophylaxis with Ecotrin 81mg po twice daily for 6 weeks  -Labs stable today but low platelet count noted; Check labs again tomorrow  -Increase ambulation and ROM left knee with PT/OT  -Dr. May for continued medical care  -Discharge planning for Rehab probable tomorrow.  -Further plan as per attendings.

## 2018-12-06 NOTE — PHYSICAL THERAPY INITIAL EVALUATION ADULT - PERSONAL SAFETY AND JUDGMENT, REHAB EVAL
pt impulsive, pt stood up without RW and transferred to EOB without calling for assist/impaired/at risk behaviors demonstrated

## 2018-12-06 NOTE — OCCUPATIONAL THERAPY INITIAL EVALUATION ADULT - ADDITIONAL COMMENTS
Pt lives alone though a friend has been staying with her. Pt lives in a ranch style house with 3 steps with a handrail to enter. Pt has a stall shower with grab bars and built-in seat. Pt owns a cane. Pt has a grab bar by her toilet. Pt reports that she has been ambulating using a cane. Pt reports that she drives a car and cooks her own meals.

## 2018-12-06 NOTE — PROGRESS NOTE ADULT - PROBLEM SELECTOR PLAN 1
Pain Management: acceptable- continue current care Tylenol ATC/Celebrex ATC;  nausea likely secondary to oxycodone, will dc oxy and start tramadol prn pain  Continue PT/OT  DVT proph: discussed recalculation of caprini score. patient not going to get up and move, and has some LE edema bilat.  would favor changing to Eliquis for 12 days and then aspirin to complete 6 weeks.  DC plan:  [  ] Home with HC  [ x ] Rehab   [  ] TBD  [  ]other:___________

## 2018-12-06 NOTE — OCCUPATIONAL THERAPY INITIAL EVALUATION ADULT - PERTINENT HX OF CURRENT PROBLEM, REHAB EVAL
85 y/o female s/p Left TKR on 12/5/18 by Dr. Fernandez. +Miranda catheter inserted due to post-op urinary retention. Pt c/o chest pain post-op; EKG and troponin levels (-) as per Celia Anderson RN.

## 2018-12-06 NOTE — OCCUPATIONAL THERAPY INITIAL EVALUATION ADULT - GENERAL OBSERVATIONS, REHAB EVAL
Pt found supine in bed with +IV, +hemovac/+bandage left knee, +telemonitor, and +Miranda catheter. Pt is kyphotic.

## 2018-12-06 NOTE — PHYSICAL THERAPY INITIAL EVALUATION ADULT - IMPAIRMENTS FOUND, PT EVAL
gait, locomotion, and balance/muscle strength/posture/poor safety awareness/cognitive impairment/ROM

## 2018-12-06 NOTE — PHYSICAL THERAPY INITIAL EVALUATION ADULT - CRITERIA FOR SKILLED THERAPEUTIC INTERVENTIONS
rehab/impairments found/anticipated equipment needs at discharge/anticipated discharge recommendation

## 2018-12-06 NOTE — PHYSICAL THERAPY INITIAL EVALUATION ADULT - RANGE OF MOTION EXAMINATION, REHAB EVAL
bilateral upper extremity ROM was WFL (within functional limits)/bilateral lower extremity ROM was WFL (within functional limits)/left knee flx 70 deg

## 2018-12-06 NOTE — PROGRESS NOTE ADULT - SUBJECTIVE AND OBJECTIVE BOX
Patient is a 84y old  Female who presents with a chief complaint of left knee pain    INTERVAL HPI/OVERNIGHT EVENTS: no further episodes of chest pain,  patient did have some confusion overnight.    She is currently complaining about nausea and pain in her left knee.  No vomiting.      MEDICATIONS  (STANDING):  acetaminophen   Tablet .. 1000 milliGRAM(s) Oral every 8 hours  aspirin enteric coated 81 milliGRAM(s) Oral every 12 hours  celecoxib 200 milliGRAM(s) Oral two times a day  docusate sodium 100 milliGRAM(s) Oral three times a day  lactated ringers. 1000 milliLiter(s) (125 mL/Hr) IV Continuous <Continuous>  levothyroxine 25 MICROGram(s) Oral <User Schedule>  pantoprazole    Tablet 40 milliGRAM(s) Oral before breakfast  senna 2 Tablet(s) Oral at bedtime    MEDICATIONS  (PRN):  aluminum hydroxide/magnesium hydroxide/simethicone Suspension 30 milliLiter(s) Oral four times a day PRN Indigestion  HYDROmorphone  Injectable 0.5 milliGRAM(s) IV Push every 3 hours PRN Severe Pain (7 - 10)  magnesium hydroxide Suspension 30 milliLiter(s) Oral daily PRN Constipation  ondansetron Injectable 4 milliGRAM(s) IV Push every 6 hours PRN Nausea and/or Vomiting  polyethylene glycol 3350 17 Gram(s) Oral daily PRN Constipation  traMADol 25 milliGRAM(s) Oral every 4 hours PRN Mild Pain (1 - 3)  traMADol 50 milliGRAM(s) Oral every 4 hours PRN Moderate Pain (4 - 6)      Allergies  cortisone (Eye Irritation)  latex (Rash)    REVIEW OF SYSTEMS:  CONSTITUTIONAL: No fever, weight loss, or fatigue  EYES: No eye pain, visual disturbances, or discharge  ENMT:  No difficulty hearing, tinnitus, vertigo; No sinus or throat pain  NECK: No pain or stiffness  BREASTS: No pain, masses, or nipple discharge  RESPIRATORY: No cough, wheezing, chills or hemoptysis; No shortness of breath  CARDIOVASCULAR: No chest pain, palpitations, or lightheadedness  GASTROINTESTINAL: No abdominal or epigastric pain. No vomiting, or hematemesis; No diarrhea or constipation. No melena or hematochezia.  GENITOURINARY: has a flores in.  NEUROLOGICAL: No headaches, vertigo, memory loss, loss of strength, numbness, or tremors  SKIN: No itching, burning, rashes, or lesions   LYMPH NODES: No enlarged glands  ENDOCRINE: No heat or cold intolerance; No hair loss; No polydipsia or polyuria  MUSCULOSKELETAL: No back pain  PSYCHIATRIC: No depression, anxiety, or mood swings  HEME/LYMPH: No easy bruising, or bleeding gums  ALLERGY AND IMMUNOLOGIC: No hives or eczema    Vital Signs Last 24 Hrs  T(C): 37.2 (06 Dec 2018 11:53), Max: 37.2 (06 Dec 2018 11:53)  T(F): 98.9 (06 Dec 2018 11:53), Max: 98.9 (06 Dec 2018 11:53)  HR: 94 (06 Dec 2018 11:53) (78 - 94)  BP: 131/68 (06 Dec 2018 11:53) (104/74 - 160/97)  BP(mean): --  RR: 18 (06 Dec 2018 11:53) (11 - 19)  SpO2: 95% (06 Dec 2018 11:53) (93% - 100%)    PHYSICAL EXAM:  GENERAL: NAD, well-groomed, well-developed  HEAD:  Atraumatic, Normocephalic  EYES: EOMI, PERRLA, conjunctiva and sclera clear  ENMT: Moist mucous membranes  NECK: +kyphosis, No JVD  NERVOUS SYSTEM:  Alert & Oriented X3, Good concentration; Bilateral LE mobile, sensation to light touch intact  CHEST/LUNG: Clear to auscultation bilaterally;   HEART: Regular rate and rhythm; No murmurs, rubs, or gallops  ABDOMEN: Soft, Nontender, Nondistended; Bowel sounds present  EXTREMITIES:  2+ Peripheral Pulses, No clubbing or cyanosis; no calf tenderness; +bilateral edema  LYMPH: No lymphadenopathy noted  SKIN: No rashes or lesions  INCISION:  Dressing dry and intact    LABS:                        11.7   7.29  )-----------( 128      ( 06 Dec 2018 05:57 )             35.6     06 Dec 2018 05:57    140    |  102    |  11     ----------------------------<  126    4.1     |  31     |  0.72     Ca    8.4        06 Dec 2018 05:57        Troponin trend:  .027  12-06 @ 05:57  .010  12-05 @ 18:37      CAPILLARY BLOOD GLUCOSE          RADIOLOGY & ADDITIONAL TESTS:    Imaging Personally Reviewed:      [ ] Consultant(s) Notes Reviewed  [x] Care Discussed with Consultants/Other Providers:  Ortho PA- plan of care

## 2018-12-06 NOTE — PROGRESS NOTE ADULT - PROBLEM SELECTOR PLAN 2
troponins non elevated, chest pain resolved.  no tele events, recent stress negative.   unlikely to be ACS/cardiac in nature.  most likely MSK or anxiety

## 2018-12-07 ENCOUNTER — TRANSCRIPTION ENCOUNTER (OUTPATIENT)
Age: 83
End: 2018-12-07

## 2018-12-07 LAB
ANION GAP SERPL CALC-SCNC: 7 MMOL/L — SIGNIFICANT CHANGE UP (ref 5–17)
BUN SERPL-MCNC: 15 MG/DL — SIGNIFICANT CHANGE UP (ref 7–23)
CALCIUM SERPL-MCNC: 8.4 MG/DL — SIGNIFICANT CHANGE UP (ref 8.4–10.5)
CHLORIDE SERPL-SCNC: 103 MMOL/L — SIGNIFICANT CHANGE UP (ref 96–108)
CO2 SERPL-SCNC: 29 MMOL/L — SIGNIFICANT CHANGE UP (ref 22–31)
CREAT SERPL-MCNC: 0.78 MG/DL — SIGNIFICANT CHANGE UP (ref 0.5–1.3)
GLUCOSE SERPL-MCNC: 150 MG/DL — HIGH (ref 70–99)
HCT VFR BLD CALC: 37.3 % — SIGNIFICANT CHANGE UP (ref 34.5–45)
HGB BLD-MCNC: 12 G/DL — SIGNIFICANT CHANGE UP (ref 11.5–15.5)
MCHC RBC-ENTMCNC: 29.3 PG — SIGNIFICANT CHANGE UP (ref 27–34)
MCHC RBC-ENTMCNC: 32.2 GM/DL — SIGNIFICANT CHANGE UP (ref 32–36)
MCV RBC AUTO: 91 FL — SIGNIFICANT CHANGE UP (ref 80–100)
NRBC # BLD: 0 /100 WBCS — SIGNIFICANT CHANGE UP (ref 0–0)
PLATELET # BLD AUTO: 146 K/UL — LOW (ref 150–400)
POTASSIUM SERPL-MCNC: 3.6 MMOL/L — SIGNIFICANT CHANGE UP (ref 3.5–5.3)
POTASSIUM SERPL-SCNC: 3.6 MMOL/L — SIGNIFICANT CHANGE UP (ref 3.5–5.3)
RBC # BLD: 4.1 M/UL — SIGNIFICANT CHANGE UP (ref 3.8–5.2)
RBC # FLD: 13.8 % — SIGNIFICANT CHANGE UP (ref 10.3–14.5)
SODIUM SERPL-SCNC: 139 MMOL/L — SIGNIFICANT CHANGE UP (ref 135–145)
WBC # BLD: 8.26 K/UL — SIGNIFICANT CHANGE UP (ref 3.8–10.5)
WBC # FLD AUTO: 8.26 K/UL — SIGNIFICANT CHANGE UP (ref 3.8–10.5)

## 2018-12-07 PROCEDURE — 99233 SBSQ HOSP IP/OBS HIGH 50: CPT

## 2018-12-07 RX ORDER — OXYCODONE HYDROCHLORIDE 5 MG/1
5 TABLET ORAL
Qty: 0 | Refills: 0 | Status: DISCONTINUED | OUTPATIENT
Start: 2018-12-07 | End: 2018-12-08

## 2018-12-07 RX ORDER — LIDOCAINE 4 G/100G
1 CREAM TOPICAL DAILY
Qty: 0 | Refills: 0 | Status: DISCONTINUED | OUTPATIENT
Start: 2018-12-07 | End: 2018-12-08

## 2018-12-07 RX ORDER — OXYCODONE HYDROCHLORIDE 5 MG/1
2.5 TABLET ORAL
Qty: 0 | Refills: 0 | Status: DISCONTINUED | OUTPATIENT
Start: 2018-12-07 | End: 2018-12-08

## 2018-12-07 RX ADMIN — Medication 1000 MILLIGRAM(S): at 01:01

## 2018-12-07 RX ADMIN — TRAMADOL HYDROCHLORIDE 50 MILLIGRAM(S): 50 TABLET ORAL at 09:23

## 2018-12-07 RX ADMIN — Medication 1000 MILLIGRAM(S): at 18:57

## 2018-12-07 RX ADMIN — LIDOCAINE 1 PATCH: 4 CREAM TOPICAL at 12:32

## 2018-12-07 RX ADMIN — Medication 100 MILLIGRAM(S): at 21:07

## 2018-12-07 RX ADMIN — CELECOXIB 200 MILLIGRAM(S): 200 CAPSULE ORAL at 10:00

## 2018-12-07 RX ADMIN — Medication 100 MILLIGRAM(S): at 13:51

## 2018-12-07 RX ADMIN — Medication 1000 MILLIGRAM(S): at 01:19

## 2018-12-07 RX ADMIN — Medication 1000 MILLIGRAM(S): at 09:22

## 2018-12-07 RX ADMIN — TRAMADOL HYDROCHLORIDE 50 MILLIGRAM(S): 50 TABLET ORAL at 09:55

## 2018-12-07 RX ADMIN — Medication 1000 MILLIGRAM(S): at 18:51

## 2018-12-07 RX ADMIN — Medication 50 MICROGRAM(S): at 05:37

## 2018-12-07 RX ADMIN — CELECOXIB 200 MILLIGRAM(S): 200 CAPSULE ORAL at 09:28

## 2018-12-07 RX ADMIN — CELECOXIB 200 MILLIGRAM(S): 200 CAPSULE ORAL at 21:07

## 2018-12-07 RX ADMIN — TRAMADOL HYDROCHLORIDE 50 MILLIGRAM(S): 50 TABLET ORAL at 06:07

## 2018-12-07 RX ADMIN — TRAMADOL HYDROCHLORIDE 50 MILLIGRAM(S): 50 TABLET ORAL at 05:37

## 2018-12-07 RX ADMIN — Medication 1000 MILLIGRAM(S): at 09:40

## 2018-12-07 RX ADMIN — APIXABAN 2.5 MILLIGRAM(S): 2.5 TABLET, FILM COATED ORAL at 09:23

## 2018-12-07 RX ADMIN — APIXABAN 2.5 MILLIGRAM(S): 2.5 TABLET, FILM COATED ORAL at 21:07

## 2018-12-07 NOTE — DISCHARGE NOTE ADULT - NS AS ACTIVITY OBS
Showering allowed/Do not make important decisions/Do not drive or operate machinery/Walking-Indoors allowed/Stairs allowed

## 2018-12-07 NOTE — DISCHARGE NOTE ADULT - PLAN OF CARE
Improve ambulation, ADLs and quality of life Physical Therapy/Occupational Therapy for ambulation, transfers, stairs, ADL's, Range of Motion Exercises, Isometrics.  Full weight bearing as tolerated with rolling walker  Range of Motion Goals: Flexion 120 degrees; Extension 0 degrees  Keep incision clean and dry.  Suture/prineo dressing removal 14 days after surgery at rehab facility or Surgeon's office  May shower post-op day #5 if no drainage from incision wound daily dry dressing required, clean with saline and apply dry gauze until incision is dry  if drainage gets worse notify the surgeons office

## 2018-12-07 NOTE — DISCHARGE NOTE ADULT - CARE PLAN
Principal Discharge DX:	Primary osteoarthritis of left knee  Goal:	Improve ambulation, ADLs and quality of life  Assessment and plan of treatment:	Physical Therapy/Occupational Therapy for ambulation, transfers, stairs, ADL's, Range of Motion Exercises, Isometrics.  Full weight bearing as tolerated with rolling walker  Range of Motion Goals: Flexion 120 degrees; Extension 0 degrees  Keep incision clean and dry.  Suture/prineo dressing removal 14 days after surgery at rehab facility or Surgeon's office  May shower post-op day #5 if no drainage from incision  Goal:	wound  Assessment and plan of treatment:	daily dry dressing required, clean with saline and apply dry gauze until incision is dry  if drainage gets worse notify the surgeons office

## 2018-12-07 NOTE — PHARMACOTHERAPY INTERVENTION NOTE - COMMENTS
Patient unable to ambulate postop. Caprini score now 10 (high risk). VTE prophylaxis regimen switched from ASA 81mg q12hx 6 weeks to Eliquis 2.5mg q12h x 12 days followed by ASA 81mg q12h x 4 weeks. Patient is refusing Eliquis, although has taken a couple of doses with explanation for risk of VTE. Unsure she will remain compliant after discharge. Not listening well and also not remembering well.

## 2018-12-07 NOTE — PROGRESS NOTE ADULT - PROBLEM SELECTOR PLAN 1
Pain Management: acceptable- continue current care Tylenol ATC/Celebrex ATC;  will dc tramadol and go back to oxy at lower dose, add lidocaine patch.   Continue PT/OT  DVT proph: moderate -high risk continue elijesusita JORGENSEN plan:  [  ] Home with HC  [ x ] Rehab - tomorrow

## 2018-12-07 NOTE — PROGRESS NOTE ADULT - SUBJECTIVE AND OBJECTIVE BOX
Patient is a 84y old  Female who presents with a chief complaint of     INTERVAL HPI/OVERNIGHT EVENTS: nauseated again today.  reporting the tramadol is not helping she is in more than 10/10 pain despite appearing comfortable in the chair.  No BM, is urinating well since Miranda removed.       MEDICATIONS  (STANDING):  acetaminophen   Tablet .. 1000 milliGRAM(s) Oral every 8 hours  apixaban 2.5 milliGRAM(s) Oral every 12 hours  celecoxib 200 milliGRAM(s) Oral two times a day  docusate sodium 100 milliGRAM(s) Oral three times a day  lactated ringers. 1000 milliLiter(s) (125 mL/Hr) IV Continuous <Continuous>  levothyroxine 25 MICROGram(s) Oral <User Schedule>  levothyroxine 50 MICROGram(s) Oral <User Schedule>  lidocaine   Patch 1 Patch Transdermal daily  pantoprazole    Tablet 40 milliGRAM(s) Oral before breakfast  senna 2 Tablet(s) Oral at bedtime    MEDICATIONS  (PRN):  aluminum hydroxide/magnesium hydroxide/simethicone Suspension 30 milliLiter(s) Oral four times a day PRN Indigestion  bisacodyl Suppository 10 milliGRAM(s) Rectal daily PRN If no bowel movement by postoperative day #2  HYDROmorphone  Injectable 0.5 milliGRAM(s) IV Push every 3 hours PRN Severe Pain (7 - 10)  magnesium hydroxide Suspension 30 milliLiter(s) Oral daily PRN Constipation  ondansetron Injectable 4 milliGRAM(s) IV Push every 6 hours PRN Nausea and/or Vomiting  oxyCODONE    IR 2.5 milliGRAM(s) Oral every 3 hours PRN Mild Pain (1 - 3)  oxyCODONE    IR 5 milliGRAM(s) Oral every 3 hours PRN Moderate Pain (4 - 6)  polyethylene glycol 3350 17 Gram(s) Oral daily PRN Constipation      Allergies  cortisone (Eye Irritation)  latex (Rash)    REVIEW OF SYSTEMS:  CONSTITUTIONAL: No fever, weight loss, or fatigue  EYES: No eye pain, visual disturbances, or discharge  ENMT:  No difficulty hearing, tinnitus, vertigo; No sinus or throat pain  NECK: No pain or stiffness  BREASTS: No pain, masses, or nipple discharge  RESPIRATORY: No cough, wheezing, chills or hemoptysis; No shortness of breath  CARDIOVASCULAR: No chest pain, palpitations, or lightheadedness  GASTROINTESTINAL: see hpi  GENITOURINARY: No dysuria, frequency, hematuria, or incontinence  NEUROLOGICAL: No headaches, vertigo, memory loss, loss of strength, numbness, or tremors  SKIN: No itching, burning, rashes, or lesions   LYMPH NODES: No enlarged glands  ENDOCRINE: No heat or cold intolerance; No hair loss; No polydipsia or polyuria  MUSCULOSKELETAL: No back pain  PSYCHIATRIC: No depression, anxiety, or mood swings  HEME/LYMPH: No easy bruising, or bleeding gums  ALLERGY AND IMMUNOLOGIC: No hives or eczema    Vital Signs Last 24 Hrs  T(C): 36.4 (07 Dec 2018 07:51), Max: 36.7 (06 Dec 2018 23:15)  T(F): 97.5 (07 Dec 2018 07:51), Max: 98.1 (06 Dec 2018 23:15)  HR: 77 (07 Dec 2018 11:30) (63 - 82)  BP: 155/83 (07 Dec 2018 07:51) (97/61 - 155/83)  BP(mean): --  RR: 18 (07 Dec 2018 07:51) (16 - 18)  SpO2: 97% (07 Dec 2018 11:30) (94% - 97%)    PHYSICAL EXAM:  GENERAL: NAD, well-groomed, well-developed  HEAD:  Atraumatic, Normocephalic  EYES: EOMI, PERRLA, conjunctiva and sclera clear  ENMT: Moist mucous membranes  NECK: Supple, No JVD  NERVOUS SYSTEM:  Alert & Oriented X3, Good concentration; Bilateral LE mobile, sensation to light touch intact  CHEST/LUNG: Clear to auscultation bilaterally; No rales, rhonchi, wheezing, or rubs  HEART: Regular rate and rhythm; No murmurs, rubs, or gallops  ABDOMEN: Soft, Nontender, Nondistended; Bowel sounds present  EXTREMITIES:  2+ Peripheral Pulses, No clubbing or cyanosis  LYMPH: No lymphadenopathy noted  SKIN: No rashes or lesions  INCISION:  Dressing dry and intact    LABS:                        12.0   8.26  )-----------( 146      ( 07 Dec 2018 10:44 )             37.3     07 Dec 2018 10:44    139    |  103    |  15     ----------------------------<  150    3.6     |  29     |  0.78     Ca    8.4        07 Dec 2018 10:44          CAPILLARY BLOOD GLUCOSE          RADIOLOGY & ADDITIONAL TESTS:    Imaging Personally Reviewed:      [ ] Consultant(s) Notes Reviewed  [x] Care Discussed with Consultants/Other Providers:  Ortho PA- plan of care

## 2018-12-07 NOTE — DISCHARGE NOTE ADULT - HOSPITAL COURSE
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  Patient went to Pre-Surgical Testing at [facility] and was medically cleared to undergo elective procedure. Left TKR performed on 12/6/18 by Dr. Fernandez.  No operative or shahzad-operative complications arose during patients hospital course.  Patient received antibiotic according to SCIP guidelines for infection prevention. Eliquis was given for DVT prophylaxis.  Anesthesia, Medical Hospitalist, Physical Therapy and Occupational Therapy were consulted. Patient is stable for discharge with a good prognosis.  Appropriate discharge instructions and medications are provided in this document.

## 2018-12-07 NOTE — PROGRESS NOTE ADULT - SUBJECTIVE AND OBJECTIVE BOX
Orthopedic P.A.- POD# 2 - s/p Left TKR    Patient alert and c/o moderate pain in her left knee this morning (given Tramadol 50mg 2 hours ago).  Will request additional pain meds after breakfast. Explained to patient that she should NOT refuse oral Celebrex and Tylenol ordered.    Vital Signs Last 24 Hrs  T(C): 36.4 (07 Dec 2018 07:51), Max: 37.2 (06 Dec 2018 11:53)  T(F): 97.5 (07 Dec 2018 07:51), Max: 98.9 (06 Dec 2018 11:53)  HR: 82 (07 Dec 2018 07:51) (63 - 94)  BP: 155/83 (07 Dec 2018 07:51) (97/61 - 155/83)  BP(mean): --  RR: 18 (07 Dec 2018 07:51) (16 - 18)  SpO2: 96% (07 Dec 2018 07:51) (93% - 98%)         I&O's Detail    06 Dec 2018 07:01  -  07 Dec 2018 07:00  --------------------------------------------------------  IN:    Oral Fluid: 120 mL  Total IN: 120 mL    OUT:    Accordian: 210 mL    Indwelling Catheter - Urethral: 2150 mL (catheter removed at 7am).  Total OUT: 2360 mL    Total NET: -2240 mL        Labs:    CBC and BMP in testing***                                                                           MEDICATIONS:acetaminophen   Tablet .. 1000 milliGRAM(s) Oral every 8 hours  aluminum hydroxide/magnesium hydroxide/simethicone Suspension 30 milliLiter(s) Oral four times a day PRN  apixaban 2.5 milliGRAM(s) Oral every 12 hours  bisacodyl Suppository 10 milliGRAM(s) Rectal daily PRN  celecoxib 200 milliGRAM(s) Oral two times a day  docusate sodium 100 milliGRAM(s) Oral three times a day  HYDROmorphone  Injectable 0.5 milliGRAM(s) IV Push every 3 hours PRN  lactated ringers. 1000 milliLiter(s) IV Continuous <Continuous>  levothyroxine 25 MICROGram(s) Oral <User Schedule>  levothyroxine 50 MICROGram(s) Oral <User Schedule>  magnesium hydroxide Suspension 30 milliLiter(s) Oral daily PRN  ondansetron Injectable 4 milliGRAM(s) IV Push every 6 hours PRN  pantoprazole    Tablet 40 milliGRAM(s) Oral before breakfast  polyethylene glycol 3350 17 Gram(s) Oral daily PRN  senna 2 Tablet(s) Oral at bedtime  traMADol 25 milliGRAM(s) Oral every 4 hours PRN  traMADol 50 milliGRAM(s) Oral every 4 hours PRN    Anticoagulation:  apixaban 2.5 milliGRAM(s) Oral every 12 hours        Pain medications:   acetaminophen   Tablet .. 1000 milliGRAM(s) Oral every 8 hours  celecoxib 200 milliGRAM(s) Oral two times a day  HYDROmorphone  Injectable 0.5 milliGRAM(s) IV Push every 3 hours PRN  ondansetron Injectable 4 milliGRAM(s) IV Push every 6 hours PRN  traMADol 25 milliGRAM(s) Oral every 4 hours PRN  traMADol 50 milliGRAM(s) Oral every 4 hours PRN                                      Physical Exam:  *knee- Primary surgical bandage dry and intact.  *Hemovac drain patent.  Neurovascular grossly intact LE's.  PAS on LE's.  Calves soft and non-tender.                                                                                                                                                          A/P:  Orthopedically stable.  -Continue pain management with *  -DVT prophylaxis with *  -Check labs again tomorrow  -Increase ambulation and ROM *knee with PT/OT  - * for medical care  -Discharge planning for *  -Further plan as per attendings. Orthopedic P.A.- POD# 2 - s/p Left TKR    Patient alert and c/o moderate pain in her left knee this morning (given Tramadol 50mg 2 hours ago).  Will request additional pain meds after breakfast. Explained to patient that she should NOT refuse oral Celebrex and Tylenol ordered.    Vital Signs Last 24 Hrs  T(C): 36.4 (07 Dec 2018 07:51), Max: 37.2 (06 Dec 2018 11:53)  T(F): 97.5 (07 Dec 2018 07:51), Max: 98.9 (06 Dec 2018 11:53)  HR: 82 (07 Dec 2018 07:51) (63 - 94)  BP: 155/83 (07 Dec 2018 07:51) (97/61 - 155/83)  BP(mean): --  RR: 18 (07 Dec 2018 07:51) (16 - 18)  SpO2: 96% (07 Dec 2018 07:51) (93% - 98%)         I&O's Detail    06 Dec 2018 07:01  -  07 Dec 2018 07:00  --------------------------------------------------------  IN:    Oral Fluid: 120 mL  Total IN: 120 mL    OUT:    Accordian drain left knee: 120ml over 12 hours.    Indwelling Catheter - Urethral: 2150 mL (catheter removed at 7am).  Total OUT: 2360 mL    Total NET: -2240 mL        Labs:    CBC and BMP in testing***                                                                           MEDICATIONS:acetaminophen   Tablet .. 1000 milliGRAM(s) Oral every 8 hours  aluminum hydroxide/magnesium hydroxide/simethicone Suspension 30 milliLiter(s) Oral four times a day PRN  apixaban 2.5 milliGRAM(s) Oral every 12 hours  bisacodyl Suppository 10 milliGRAM(s) Rectal daily PRN  celecoxib 200 milliGRAM(s) Oral two times a day  docusate sodium 100 milliGRAM(s) Oral three times a day  HYDROmorphone  Injectable 0.5 milliGRAM(s) IV Push every 3 hours PRN  lactated ringers. 1000 milliLiter(s) IV Continuous <Continuous>  levothyroxine 25 MICROGram(s) Oral <User Schedule>  levothyroxine 50 MICROGram(s) Oral <User Schedule>  magnesium hydroxide Suspension 30 milliLiter(s) Oral daily PRN  ondansetron Injectable 4 milliGRAM(s) IV Push every 6 hours PRN  pantoprazole    Tablet 40 milliGRAM(s) Oral before breakfast  polyethylene glycol 3350 17 Gram(s) Oral daily PRN  senna 2 Tablet(s) Oral at bedtime  traMADol 25 milliGRAM(s) Oral every 4 hours PRN  traMADol 50 milliGRAM(s) Oral every 4 hours PRN    Anticoagulation:  apixaban 2.5 milliGRAM(s) Oral every 12 hours        Pain medications:   acetaminophen   Tablet .. 1000 milliGRAM(s) Oral every 8 hours  celecoxib 200 milliGRAM(s) Oral two times a day  HYDROmorphone  Injectable 0.5 milliGRAM(s) IV Push every 3 hours PRN  ondansetron Injectable 4 milliGRAM(s) IV Push every 6 hours PRN  traMADol 25 milliGRAM(s) Oral every 4 hours PRN  traMADol 50 milliGRAM(s) Oral every 4 hours PRN                                      Physical Exam:  Left knee- Primary surgical bandage and hemovac drain removed and sterile bandage placed over dry and intact sutured surgical wound.  Neurovascular grossly intact LE's.  PAS on LE's.  Calves soft and non-tender.                                                                                                                                                          A/P:  Orthopedically stable.  -Continue pain management with above plan  -DVT prophylaxis with Eliquis due to increase Caprini score (slow progression with mobility).  -Check labs again today (CBC and BMP in testing).  -Increase ambulation and ROM left knee with PT/OT  -Dr. May for continued medical care  -Discharge planning for Reb  -Further plan as per attendings. Orthopedic P.A.- POD# 2 - s/p Left TKR    Patient alert and c/o moderate pain in her left knee this morning (given Tramadol 50mg 2 hours ago).  Will request additional pain meds after breakfast. Explained to patient that she should NOT refuse oral Celebrex and Tylenol ordered.    Vital Signs Last 24 Hrs  T(C): 36.4 (07 Dec 2018 07:51), Max: 37.2 (06 Dec 2018 11:53)  T(F): 97.5 (07 Dec 2018 07:51), Max: 98.9 (06 Dec 2018 11:53)  HR: 82 (07 Dec 2018 07:51) (63 - 94)  BP: 155/83 (07 Dec 2018 07:51) (97/61 - 155/83)  BP(mean): --  RR: 18 (07 Dec 2018 07:51) (16 - 18)  SpO2: 96% (07 Dec 2018 07:51) (93% - 98%)         I&O's Detail    06 Dec 2018 07:01  -  07 Dec 2018 07:00  --------------------------------------------------------  IN:    Oral Fluid: 120 mL  Total IN: 120 mL    OUT:    Accordian drain left knee: 120ml over 12 hours.    Indwelling Catheter - Urethral: 2150 mL (catheter removed at 7am).  Total OUT: 2360 mL    Total NET: -2240 mL        Labs:    CBC and BMP in testing***                                                                           MEDICATIONS:acetaminophen   Tablet .. 1000 milliGRAM(s) Oral every 8 hours  aluminum hydroxide/magnesium hydroxide/simethicone Suspension 30 milliLiter(s) Oral four times a day PRN  apixaban 2.5 milliGRAM(s) Oral every 12 hours  bisacodyl Suppository 10 milliGRAM(s) Rectal daily PRN  celecoxib 200 milliGRAM(s) Oral two times a day  docusate sodium 100 milliGRAM(s) Oral three times a day  HYDROmorphone  Injectable 0.5 milliGRAM(s) IV Push every 3 hours PRN  lactated ringers. 1000 milliLiter(s) IV Continuous <Continuous>  levothyroxine 25 MICROGram(s) Oral <User Schedule>  levothyroxine 50 MICROGram(s) Oral <User Schedule>  magnesium hydroxide Suspension 30 milliLiter(s) Oral daily PRN  ondansetron Injectable 4 milliGRAM(s) IV Push every 6 hours PRN  pantoprazole    Tablet 40 milliGRAM(s) Oral before breakfast  polyethylene glycol 3350 17 Gram(s) Oral daily PRN  senna 2 Tablet(s) Oral at bedtime  traMADol 25 milliGRAM(s) Oral every 4 hours PRN  traMADol 50 milliGRAM(s) Oral every 4 hours PRN    Anticoagulation:  apixaban 2.5 milliGRAM(s) Oral every 12 hours        Pain medications:   acetaminophen   Tablet .. 1000 milliGRAM(s) Oral every 8 hours  celecoxib 200 milliGRAM(s) Oral two times a day  HYDROmorphone  Injectable 0.5 milliGRAM(s) IV Push every 3 hours PRN  ondansetron Injectable 4 milliGRAM(s) IV Push every 6 hours PRN  traMADol 25 milliGRAM(s) Oral every 4 hours PRN  traMADol 50 milliGRAM(s) Oral every 4 hours PRN                                      Physical Exam:  Left knee- Primary surgical bandage and hemovac drain removed and sterile bandage placed over dry and intact sutured surgical wound.  Neurovascular grossly intact LE's.  PAS on LE's.  Calves soft and non-tender.                                                                                                                                                          A/P:  Orthopedically stable.  -Continue pain management with above plan  -DVT prophylaxis with Eliquis due to increase Caprini score (slow progression with mobility).  -Check labs again today (CBC and BMP in testing).  -Increase ambulation and ROM left knee with PT/OT  -Dr. May for continued medical care  -Discharge planning for Rehab possible for today if medically cleared.  -Further plan as per attendings.

## 2018-12-07 NOTE — DISCHARGE NOTE ADULT - CARE PROVIDER_API CALL
Srinivasa Fernandez), Orthopedics  [address] [place] [address]  Phone: [phone]  Fax: [phone]

## 2018-12-07 NOTE — DISCHARGE NOTE ADULT - PATIENT PORTAL LINK FT
You can access the [facility] Patient Portal, offered by [facility], by registering with the following website: [email or web address]

## 2018-12-07 NOTE — DISCHARGE NOTE ADULT - MEDICATION SUMMARY - MEDICATIONS TO CHANGE
I will SWITCH the dose or number of times a day I take the medications listed below when I get home from the hospital:    aspirin 325 mg oral tablet  -- 1 tab(s) by mouth once a day, As Needed

## 2018-12-07 NOTE — DISCHARGE NOTE ADULT - MEDICATION SUMMARY - MEDICATIONS TO TAKE
I will START or STAY ON the medications listed below when I get home from the hospital:    celecoxib 200 mg oral capsule  -- 1 cap(s) by mouth 2 times a day x 30 days  -- Indication: For Pain    oxyCODONE 5 mg oral tablet  -- 1 tab(s) by mouth every 3 hours, As needed, Moderate Pain (4 - 6)  -- Indication: For Pain    acetaminophen 500 mg oral tablet  -- 2 tab(s) by mouth every 12 hours  -- Indication: For Pain    Aspirin Low Dose 81 mg oral delayed release tablet  -- 1 tab(s) by mouth once a day  -- Indication: For CAD    apixaban 2.5 mg oral tablet  -- 1 tab(s) by mouth every 12 hours x 10 more days, then Ecotrin 81 mg twice a day for 4 weeks  -- Indication: For DVT prophylaxis    docusate sodium 100 mg oral capsule  -- 1 cap(s) by mouth 3 times a day  -- Indication: For Constipation    polyethylene glycol 3350 oral powder for reconstitution  -- 17 gram(s) by mouth once a day, As needed, Constipation  -- Indication: For Constipation    senna oral tablet  -- 2 tab(s) by mouth once a day (at bedtime)  -- Indication: For Constipation    pantoprazole 40 mg oral delayed release tablet  -- 1 tab(s) by mouth once a day (before a meal)  -- Indication: For Acid reflux    Synthroid 25 mcg (0.025 mg) oral tablet  -- 1 tab(s) by mouth 4 times a week  -- Indication: For hypothyrodsim    Synthroid 50 mcg (0.05 mg) oral tablet  -- 1 tab(s) by mouth 3 times a week  -- Indication: For hypothyodism

## 2018-12-08 VITALS
OXYGEN SATURATION: 95 % | HEART RATE: 80 BPM | SYSTOLIC BLOOD PRESSURE: 129 MMHG | TEMPERATURE: 98 F | RESPIRATION RATE: 18 BRPM | DIASTOLIC BLOOD PRESSURE: 85 MMHG

## 2018-12-08 DIAGNOSIS — Z29.9 ENCOUNTER FOR PROPHYLACTIC MEASURES, UNSPECIFIED: ICD-10-CM

## 2018-12-08 LAB
ANION GAP SERPL CALC-SCNC: 7 MMOL/L — SIGNIFICANT CHANGE UP (ref 5–17)
BUN SERPL-MCNC: 12 MG/DL — SIGNIFICANT CHANGE UP (ref 7–23)
CALCIUM SERPL-MCNC: 8.7 MG/DL — SIGNIFICANT CHANGE UP (ref 8.4–10.5)
CHLORIDE SERPL-SCNC: 102 MMOL/L — SIGNIFICANT CHANGE UP (ref 96–108)
CO2 SERPL-SCNC: 30 MMOL/L — SIGNIFICANT CHANGE UP (ref 22–31)
CREAT SERPL-MCNC: 0.72 MG/DL — SIGNIFICANT CHANGE UP (ref 0.5–1.3)
GLUCOSE SERPL-MCNC: 101 MG/DL — HIGH (ref 70–99)
POTASSIUM SERPL-MCNC: 3.7 MMOL/L — SIGNIFICANT CHANGE UP (ref 3.5–5.3)
POTASSIUM SERPL-SCNC: 3.7 MMOL/L — SIGNIFICANT CHANGE UP (ref 3.5–5.3)
SODIUM SERPL-SCNC: 139 MMOL/L — SIGNIFICANT CHANGE UP (ref 135–145)

## 2018-12-08 PROCEDURE — 97116 GAIT TRAINING THERAPY: CPT

## 2018-12-08 PROCEDURE — 99239 HOSP IP/OBS DSCHRG MGMT >30: CPT

## 2018-12-08 PROCEDURE — 85027 COMPLETE CBC AUTOMATED: CPT

## 2018-12-08 PROCEDURE — 83036 HEMOGLOBIN GLYCOSYLATED A1C: CPT

## 2018-12-08 PROCEDURE — 97110 THERAPEUTIC EXERCISES: CPT

## 2018-12-08 PROCEDURE — 73562 X-RAY EXAM OF KNEE 3: CPT

## 2018-12-08 PROCEDURE — 97161 PT EVAL LOW COMPLEX 20 MIN: CPT

## 2018-12-08 PROCEDURE — 84484 ASSAY OF TROPONIN QUANT: CPT

## 2018-12-08 PROCEDURE — 85018 HEMOGLOBIN: CPT

## 2018-12-08 PROCEDURE — C1713: CPT

## 2018-12-08 PROCEDURE — C1776: CPT

## 2018-12-08 PROCEDURE — 36415 COLL VENOUS BLD VENIPUNCTURE: CPT

## 2018-12-08 PROCEDURE — 71045 X-RAY EXAM CHEST 1 VIEW: CPT

## 2018-12-08 PROCEDURE — C1889: CPT

## 2018-12-08 PROCEDURE — 88305 TISSUE EXAM BY PATHOLOGIST: CPT

## 2018-12-08 PROCEDURE — 85014 HEMATOCRIT: CPT

## 2018-12-08 PROCEDURE — 97165 OT EVAL LOW COMPLEX 30 MIN: CPT

## 2018-12-08 PROCEDURE — 88311 DECALCIFY TISSUE: CPT

## 2018-12-08 PROCEDURE — 93005 ELECTROCARDIOGRAM TRACING: CPT

## 2018-12-08 PROCEDURE — 80048 BASIC METABOLIC PNL TOTAL CA: CPT

## 2018-12-08 RX ORDER — CELECOXIB 200 MG/1
1 CAPSULE ORAL
Qty: 0 | Refills: 0 | COMMUNITY
Start: 2018-12-08

## 2018-12-08 RX ORDER — DOCUSATE SODIUM 100 MG
1 CAPSULE ORAL
Qty: 0 | Refills: 0 | COMMUNITY
Start: 2018-12-08

## 2018-12-08 RX ORDER — POLYETHYLENE GLYCOL 3350 17 G/17G
17 POWDER, FOR SOLUTION ORAL
Qty: 0 | Refills: 0 | COMMUNITY
Start: 2018-12-08

## 2018-12-08 RX ORDER — PANTOPRAZOLE SODIUM 20 MG/1
1 TABLET, DELAYED RELEASE ORAL
Qty: 0 | Refills: 0 | COMMUNITY
Start: 2018-12-08

## 2018-12-08 RX ORDER — SENNA PLUS 8.6 MG/1
2 TABLET ORAL
Qty: 0 | Refills: 0 | COMMUNITY
Start: 2018-12-08

## 2018-12-08 RX ORDER — ASPIRIN/CALCIUM CARB/MAGNESIUM 324 MG
1 TABLET ORAL
Qty: 0 | Refills: 0 | COMMUNITY

## 2018-12-08 RX ORDER — ACETAMINOPHEN 500 MG
2 TABLET ORAL
Qty: 0 | Refills: 0 | COMMUNITY
Start: 2018-12-08

## 2018-12-08 RX ORDER — OXYCODONE HYDROCHLORIDE 5 MG/1
1 TABLET ORAL
Qty: 0 | Refills: 0 | COMMUNITY
Start: 2018-12-08

## 2018-12-08 RX ORDER — APIXABAN 2.5 MG/1
1 TABLET, FILM COATED ORAL
Qty: 0 | Refills: 0 | COMMUNITY
Start: 2018-12-08

## 2018-12-08 RX ADMIN — CELECOXIB 200 MILLIGRAM(S): 200 CAPSULE ORAL at 00:21

## 2018-12-08 RX ADMIN — CELECOXIB 200 MILLIGRAM(S): 200 CAPSULE ORAL at 08:50

## 2018-12-08 RX ADMIN — Medication 100 MILLIGRAM(S): at 05:35

## 2018-12-08 RX ADMIN — Medication 1000 MILLIGRAM(S): at 09:17

## 2018-12-08 RX ADMIN — Medication 25 MICROGRAM(S): at 05:35

## 2018-12-08 RX ADMIN — LIDOCAINE 1 PATCH: 4 CREAM TOPICAL at 00:21

## 2018-12-08 RX ADMIN — APIXABAN 2.5 MILLIGRAM(S): 2.5 TABLET, FILM COATED ORAL at 08:50

## 2018-12-08 RX ADMIN — CELECOXIB 200 MILLIGRAM(S): 200 CAPSULE ORAL at 09:20

## 2018-12-08 RX ADMIN — Medication 1000 MILLIGRAM(S): at 01:59

## 2018-12-08 RX ADMIN — LIDOCAINE 1 PATCH: 4 CREAM TOPICAL at 12:34

## 2018-12-08 RX ADMIN — Medication 1000 MILLIGRAM(S): at 02:18

## 2018-12-08 RX ADMIN — Medication 1000 MILLIGRAM(S): at 09:50

## 2018-12-08 NOTE — PROGRESS NOTE ADULT - PROBLEM SELECTOR PLAN 1
Pain Management: acceptable- continue current care Tylenol ATC/Celebrex ATC;  c/w  oxy at lower dose, add lidocaine patch.   Continue PT/OT  DVT proph: moderate -high risk continue eliquis  DC plan:  [  ] Home with HC  [ x ] Rehab - today.

## 2018-12-08 NOTE — PROGRESS NOTE ADULT - SUBJECTIVE AND OBJECTIVE BOX
Patient is a 84y old  Female who presents with a chief complaint of Left TKR (07 Dec 2018 11:24)      INTERVAL HPI/OVERNIGHT EVENTS: no acute overnight events. pt is feeling fine , stable to be discharged to Rehab today.     MEDICATIONS  (STANDING):  acetaminophen   Tablet .. 1000 milliGRAM(s) Oral every 8 hours  apixaban 2.5 milliGRAM(s) Oral every 12 hours  celecoxib 200 milliGRAM(s) Oral two times a day  docusate sodium 100 milliGRAM(s) Oral three times a day  lactated ringers. 1000 milliLiter(s) (125 mL/Hr) IV Continuous <Continuous>  levothyroxine 25 MICROGram(s) Oral <User Schedule>  levothyroxine 50 MICROGram(s) Oral <User Schedule>  lidocaine   Patch 1 Patch Transdermal daily  pantoprazole    Tablet 40 milliGRAM(s) Oral before breakfast  senna 2 Tablet(s) Oral at bedtime    MEDICATIONS  (PRN):  aluminum hydroxide/magnesium hydroxide/simethicone Suspension 30 milliLiter(s) Oral four times a day PRN Indigestion  bisacodyl Suppository 10 milliGRAM(s) Rectal daily PRN If no bowel movement by postoperative day #2  HYDROmorphone  Injectable 0.5 milliGRAM(s) IV Push every 3 hours PRN Severe Pain (7 - 10)  magnesium hydroxide Suspension 30 milliLiter(s) Oral daily PRN Constipation  ondansetron Injectable 4 milliGRAM(s) IV Push every 6 hours PRN Nausea and/or Vomiting  oxyCODONE    IR 2.5 milliGRAM(s) Oral every 3 hours PRN Mild Pain (1 - 3)  oxyCODONE    IR 5 milliGRAM(s) Oral every 3 hours PRN Moderate Pain (4 - 6)  polyethylene glycol 3350 17 Gram(s) Oral daily PRN Constipation      Allergies    cortisone (Eye Irritation)  latex (Rash)    Intolerances        REVIEW OF SYSTEMS:  CONSTITUTIONAL: No fever or chills  HEENT:  No headache, no sore throat  RESPIRATORY: No cough, wheezing, or shortness of breath  CARDIOVASCULAR: No chest pain, palpitations, or leg swelling  GASTROINTESTINAL: No nausea, vomiting, or diarrhea  GENITOURINARY: No dysuria, frequency, or hematuria  NEUROLOGICAL: no focal weakness or dizziness  SKIN:  No rashes or lesions   MUSCULOSKELETAL: no myalgias   PSYCHIATRIC: No depression or anxiety      Vital Signs Last 24 Hrs  T(C): 36.4 (08 Dec 2018 07:18), Max: 36.7 (07 Dec 2018 23:10)  T(F): 97.5 (08 Dec 2018 07:18), Max: 98.1 (07 Dec 2018 23:10)  HR: 73 (08 Dec 2018 07:18) (69 - 87)  BP: 158/92 (08 Dec 2018 07:18) (128/76 - 159/91)  BP(mean): --  RR: 18 (08 Dec 2018 07:18) (18 - 18)  SpO2: 95% (08 Dec 2018 07:18) (94% - 96%)    PHYSICAL EXAM:  GENERAL:  Elderly female sitting in the chair not in acute distress.   HEENT:  EOMI, mmm  CHEST/LUNG:  CTA b/l, no rales, wheezes, or rhonchi  HEART:  RRR, S1, S2, []murmur  ABDOMEN:  BS+, soft, NT, ND  EXTREMITIES: no edema or calf tenderness , dressing dry and intact .   NERVOUS SYSTEM: AA&Ox3 , no focal neurological deficit.    DIET:     Cultures:     LABS:                        12.4   6.11  )-----------( 142      ( 08 Dec 2018 10:03 )             39.0     CBC Full  -  ( 08 Dec 2018 10:03 )  WBC Count : 6.11 K/uL  Hemoglobin : 12.4 g/dL  Hematocrit : 39.0 %  Platelet Count - Automated : 142 K/uL  Mean Cell Volume : 93.1 fl  Mean Cell Hemoglobin : 29.6 pg  Mean Cell Hemoglobin Concentration : 31.8 gm/dL  Auto Neutrophil # : x  Auto Lymphocyte # : x  Auto Monocyte # : x  Auto Eosinophil # : x  Auto Basophil # : x  Auto Neutrophil % : x  Auto Lymphocyte % : x  Auto Monocyte % : x  Auto Eosinophil % : x  Auto Basophil % : x    08 Dec 2018 08:06    139    |  102    |  12     ----------------------------<  101    3.7     |  30     |  0.72     Ca    8.7        08 Dec 2018 08:06          CAPILLARY BLOOD GLUCOSE              RADIOLOGY & ADDITIONAL TESTS:    Personally reviewed.     Consultant(s) Notes Reviewed:  [x] YES  [ ] NO    Care Discussed with [ ] Consultants  [x] Patient  [ ] Family  [x]      [ ] Other; RN  DVT ppx  Advanced directive Patient is a 84y old  Female who presents with a chief complaint of Left TKR (07 Dec 2018 11:24)      INTERVAL HPI/OVERNIGHT EVENTS: no acute overnight events. pt is feeling fine , stable to be discharged to Sub acute Rehab today.     MEDICATIONS  (STANDING):  acetaminophen   Tablet .. 1000 milliGRAM(s) Oral every 8 hours  apixaban 2.5 milliGRAM(s) Oral every 12 hours  celecoxib 200 milliGRAM(s) Oral two times a day  docusate sodium 100 milliGRAM(s) Oral three times a day  lactated ringers. 1000 milliLiter(s) (125 mL/Hr) IV Continuous <Continuous>  levothyroxine 25 MICROGram(s) Oral <User Schedule>  levothyroxine 50 MICROGram(s) Oral <User Schedule>  lidocaine   Patch 1 Patch Transdermal daily  pantoprazole    Tablet 40 milliGRAM(s) Oral before breakfast  senna 2 Tablet(s) Oral at bedtime    MEDICATIONS  (PRN):  aluminum hydroxide/magnesium hydroxide/simethicone Suspension 30 milliLiter(s) Oral four times a day PRN Indigestion  bisacodyl Suppository 10 milliGRAM(s) Rectal daily PRN If no bowel movement by postoperative day #2  HYDROmorphone  Injectable 0.5 milliGRAM(s) IV Push every 3 hours PRN Severe Pain (7 - 10)  magnesium hydroxide Suspension 30 milliLiter(s) Oral daily PRN Constipation  ondansetron Injectable 4 milliGRAM(s) IV Push every 6 hours PRN Nausea and/or Vomiting  oxyCODONE    IR 2.5 milliGRAM(s) Oral every 3 hours PRN Mild Pain (1 - 3)  oxyCODONE    IR 5 milliGRAM(s) Oral every 3 hours PRN Moderate Pain (4 - 6)  polyethylene glycol 3350 17 Gram(s) Oral daily PRN Constipation      Allergies    cortisone (Eye Irritation)  latex (Rash)    Intolerances        REVIEW OF SYSTEMS:  CONSTITUTIONAL: No fever or chills  HEENT:  No headache, no sore throat  RESPIRATORY: No cough, wheezing, or shortness of breath  CARDIOVASCULAR: No chest pain, palpitations, or leg swelling  GASTROINTESTINAL: No nausea, vomiting, or diarrhea  GENITOURINARY: No dysuria, frequency, or hematuria  NEUROLOGICAL: no focal weakness or dizziness  SKIN:  No rashes or lesions   MUSCULOSKELETAL: no myalgias   PSYCHIATRIC: No depression or anxiety      Vital Signs Last 24 Hrs  T(C): 36.4 (08 Dec 2018 07:18), Max: 36.7 (07 Dec 2018 23:10)  T(F): 97.5 (08 Dec 2018 07:18), Max: 98.1 (07 Dec 2018 23:10)  HR: 73 (08 Dec 2018 07:18) (69 - 87)  BP: 158/92 (08 Dec 2018 07:18) (128/76 - 159/91)  BP(mean): --  RR: 18 (08 Dec 2018 07:18) (18 - 18)  SpO2: 95% (08 Dec 2018 07:18) (94% - 96%)    PHYSICAL EXAM:  GENERAL:  Elderly female sitting in the chair not in acute distress.   HEENT:  EOMI, mmm  CHEST/LUNG:  CTA b/l, no rales, wheezes, or rhonchi  HEART:  RRR, S1, S2, []murmur  ABDOMEN:  BS+, soft, NT, ND  EXTREMITIES: no edema or calf tenderness , dressing dry and intact .   NERVOUS SYSTEM: AA&Ox3 , no focal neurological deficit.    DIET:     Cultures:     LABS:                        12.4   6.11  )-----------( 142      ( 08 Dec 2018 10:03 )             39.0     CBC Full  -  ( 08 Dec 2018 10:03 )  WBC Count : 6.11 K/uL  Hemoglobin : 12.4 g/dL  Hematocrit : 39.0 %  Platelet Count - Automated : 142 K/uL  Mean Cell Volume : 93.1 fl  Mean Cell Hemoglobin : 29.6 pg  Mean Cell Hemoglobin Concentration : 31.8 gm/dL  Auto Neutrophil # : x  Auto Lymphocyte # : x  Auto Monocyte # : x  Auto Eosinophil # : x  Auto Basophil # : x  Auto Neutrophil % : x  Auto Lymphocyte % : x  Auto Monocyte % : x  Auto Eosinophil % : x  Auto Basophil % : x    08 Dec 2018 08:06    139    |  102    |  12     ----------------------------<  101    3.7     |  30     |  0.72     Ca    8.7        08 Dec 2018 08:06          CAPILLARY BLOOD GLUCOSE              RADIOLOGY & ADDITIONAL TESTS:    Personally reviewed.     Consultant(s) Notes Reviewed:  [x] YES  [ ] NO    Care Discussed with [ ] Consultants  [x] Patient  [ ] Family  [x]      [ ] Other; RN  DVT ppx  Advanced directive

## 2018-12-08 NOTE — PROGRESS NOTE ADULT - PROBLEM SELECTOR PLAN 2
ACS ruled out . this am pt denied any chest pain or palpitation.   no tele events, recent stress negative.   unlikely to be ACS/cardiac in nature.  most likely MSK or anxiety.   BP is on the higher side .   Recheck BP .   Close monitoring of BP.

## 2018-12-08 NOTE — PROGRESS NOTE ADULT - SUBJECTIVE AND OBJECTIVE BOX
POST OPERATIVE DAY #: 3    84y Female  s/p  Left  TKA                     SUBJECTIVE: Patient seen and examined at bedside.     Pain:  well controlled    Pain scale:   5/10  Denies CP, SOB, N/V/D, weakness, numbness   No new complains     OBJECTIVE:     Vital Signs Last 24 Hrs  T(C): 36.4 (08 Dec 2018 07:18), Max: 36.7 (07 Dec 2018 23:10)  T(F): 97.5 (08 Dec 2018 07:18), Max: 98.1 (07 Dec 2018 23:10)  HR: 73 (08 Dec 2018 07:18) (69 - 87)  BP: 158/92 (08 Dec 2018 07:18) (128/76 - 159/91)  BP(mean): --  RR: 18 (08 Dec 2018 07:18) (18 - 18)  SpO2: 95% (08 Dec 2018 07:18) (94% - 97%)    Affected extremity:  LLE         Dressing: changed, nylon minimal sanguinous drainage from distal incision                           Sensation: intact to light touch          Motor exam:   / 5 Tibialis Anterior/Gastrocnemius-Soleus, EHL/FHL         warm, well-perfused; capillary refill < 3 seconds         negative calf tenderness B/L LE  LABS:                        12.0   8.26  )-----------( 146      ( 07 Dec 2018 10:44 )             37.3     12-08    139  |  102  |  12  ----------------------------<  101<H>  3.7   |  30  |  0.72    Ca    8.7      08 Dec 2018 08:06          MEDICATIONS:      Pain management:  acetaminophen   Tablet .. 1000 milliGRAM(s) Oral every 8 hours  celecoxib 200 milliGRAM(s) Oral two times a day  HYDROmorphone  Injectable 0.5 milliGRAM(s) IV Push every 3 hours PRN  ondansetron Injectable 4 milliGRAM(s) IV Push every 6 hours PRN  oxyCODONE    IR 2.5 milliGRAM(s) Oral every 3 hours PRN  oxyCODONE    IR 5 milliGRAM(s) Oral every 3 hours PRN    DVT prophylaxis:   apixaban 2.5 milliGRAM(s) Oral every 12 hours      RADIOLOGY & ADDITIONAL STUDIES:    ASSESSMENT AND PLAN:     - Analgesic pain control  - DVT prophylaxis: ASA 81 mg daily    Eliquis2.5mg twice a day   SCDs       - Pain Management: Celebrex 200mg twice a day x 21 days   - PT/OT: Weight Bearing Status:  Weight bearing as tolerated, OOBTC         -  Incentive spirometry  - IVF  - Advance diet as tolerated  - Hospitalist is following  -  Follow up labs  -  Disposition:  Subacute Rehab today

## 2018-12-10 ENCOUNTER — CHART COPY (OUTPATIENT)
Age: 83
End: 2018-12-10

## 2018-12-14 ENCOUNTER — APPOINTMENT (OUTPATIENT)
Age: 83
End: 2018-12-14
Payer: MEDICARE

## 2018-12-14 VITALS
DIASTOLIC BLOOD PRESSURE: 70 MMHG | HEART RATE: 80 BPM | RESPIRATION RATE: 18 BRPM | SYSTOLIC BLOOD PRESSURE: 120 MMHG | OXYGEN SATURATION: 97 %

## 2018-12-14 DIAGNOSIS — M17.12 UNILATERAL PRIMARY OSTEOARTHRITIS, LEFT KNEE: ICD-10-CM

## 2018-12-14 PROCEDURE — 99024 POSTOP FOLLOW-UP VISIT: CPT

## 2018-12-14 RX ORDER — ASPIRIN 81 MG/1
81 TABLET ORAL TWICE DAILY
Qty: 60 | Refills: 0 | Status: ACTIVE | COMMUNITY
Start: 2018-12-14

## 2018-12-14 RX ORDER — OXYCODONE 5 MG/1
5 TABLET ORAL
Qty: 50 | Refills: 0 | Status: DISCONTINUED | COMMUNITY
Start: 2018-12-10 | End: 2018-12-14

## 2018-12-14 RX ORDER — APIXABAN 2.5 MG/1
2.5 TABLET, FILM COATED ORAL
Qty: 14 | Refills: 0 | Status: DISCONTINUED | COMMUNITY
Start: 2018-12-10 | End: 2018-12-14

## 2018-12-19 ENCOUNTER — APPOINTMENT (OUTPATIENT)
Age: 83
End: 2018-12-19
Payer: MEDICARE

## 2018-12-19 VITALS
SYSTOLIC BLOOD PRESSURE: 120 MMHG | OXYGEN SATURATION: 97 % | DIASTOLIC BLOOD PRESSURE: 80 MMHG | HEART RATE: 78 BPM | RESPIRATION RATE: 18 BRPM

## 2018-12-19 PROCEDURE — 99024 POSTOP FOLLOW-UP VISIT: CPT

## 2018-12-19 NOTE — PHYSICAL EXAM
[No Acute Distress] : no acute distress [Well Nourished] : well nourished [Well Developed] : well developed [No Respiratory Distress] : no respiratory distress  [Clear to Auscultation] : lungs were clear to auscultation bilaterally [Normal Rate] : normal rate  [Regular Rhythm] : with a regular rhythm normal... Well appearing, well nourished, awake, alert, oriented to person, place, time/situation and in no apparent distress. [Normal S1, S2] : normal S1 and S2 [Pedal Pulses Present] : the pedal pulses are present [Soft] : abdomen soft [Non-distended] : non-distended [Normal Bowel Sounds] : normal bowel sounds [No Joint Swelling] : no joint swelling [de-identified] : negative hormans [de-identified] : incision slightly less redness noted well approx, slight serosanguinous drainage noted to lower aspect of incision, non tender, no inc warmth, noted rash to inner aspect of knee and posterior knee, slight redness noted to lower leg, non tender no inc warmth

## 2018-12-19 NOTE — PLAN
[FreeTextEntry1] : Spoke with pharmacy, bactrim to be delivered to patient today, advised importance of taking both abx, patient hesitant on continuing keflex, plans to discuss with ortho surgeon on Friday\par Discussed rash most likely d/t ace bandage on knee after d/w ortho surgeon's NP.  \par Case d/w PT, advised can restart PT\par Reminded of CN/NP role and yellow card, advised to call with any questions/concerns, verbalized understanding

## 2018-12-19 NOTE — HISTORY OF PRESENT ILLNESS
[FreeTextEntry1] : f/u cellulitis s/p LTKR\par  [de-identified] : Patient seen for follow up celluitis to Left knee.  Patient was started on Keflex, was not taking bactrim DS.  Denies fever, chills, inc drainage, SOB, CP.  Reports rash that started last night by left knee, reports stopping abx

## 2018-12-21 ENCOUNTER — APPOINTMENT (OUTPATIENT)
Dept: ORTHOPEDIC SURGERY | Facility: CLINIC | Age: 83
End: 2018-12-21
Payer: MEDICARE

## 2018-12-21 VITALS
HEART RATE: 87 BPM | SYSTOLIC BLOOD PRESSURE: 111 MMHG | HEIGHT: 63 IN | WEIGHT: 150 LBS | BODY MASS INDEX: 26.58 KG/M2 | DIASTOLIC BLOOD PRESSURE: 65 MMHG

## 2018-12-21 PROCEDURE — 73562 X-RAY EXAM OF KNEE 3: CPT | Mod: LT

## 2018-12-21 PROCEDURE — 99024 POSTOP FOLLOW-UP VISIT: CPT

## 2018-12-28 ENCOUNTER — APPOINTMENT (OUTPATIENT)
Dept: ORTHOPEDIC SURGERY | Facility: CLINIC | Age: 83
End: 2018-12-28
Payer: MEDICARE

## 2018-12-28 VITALS
WEIGHT: 150 LBS | DIASTOLIC BLOOD PRESSURE: 66 MMHG | BODY MASS INDEX: 26.58 KG/M2 | HEART RATE: 73 BPM | HEIGHT: 63 IN | SYSTOLIC BLOOD PRESSURE: 111 MMHG

## 2018-12-28 PROCEDURE — 99024 POSTOP FOLLOW-UP VISIT: CPT

## 2019-01-12 ENCOUNTER — APPOINTMENT (OUTPATIENT)
Age: 84
End: 2019-01-12
Payer: MEDICARE

## 2019-01-12 VITALS
OXYGEN SATURATION: 96 % | TEMPERATURE: 96.7 F | RESPIRATION RATE: 16 BRPM | HEART RATE: 80 BPM | DIASTOLIC BLOOD PRESSURE: 74 MMHG | SYSTOLIC BLOOD PRESSURE: 110 MMHG

## 2019-01-12 DIAGNOSIS — Z96.659 AFTERCARE FOLLOWING JOINT REPLACEMENT SURGERY: ICD-10-CM

## 2019-01-12 DIAGNOSIS — Z47.1 AFTERCARE FOLLOWING JOINT REPLACEMENT SURGERY: ICD-10-CM

## 2019-01-12 PROCEDURE — 99024 POSTOP FOLLOW-UP VISIT: CPT

## 2019-01-12 RX ORDER — CEPHALEXIN 500 MG/1
500 TABLET ORAL EVERY 6 HOURS
Qty: 28 | Refills: 0 | Status: DISCONTINUED | COMMUNITY
Start: 2018-12-14 | End: 2019-01-12

## 2019-01-12 RX ORDER — DOCUSATE SODIUM 100 MG/1
100 CAPSULE ORAL 3 TIMES DAILY
Refills: 0 | Status: DISCONTINUED | COMMUNITY
Start: 2018-12-14 | End: 2019-01-12

## 2019-01-12 RX ORDER — CELECOXIB 200 MG/1
200 CAPSULE ORAL
Qty: 42 | Refills: 0 | Status: DISCONTINUED | COMMUNITY
Start: 2018-12-10 | End: 2019-01-12

## 2019-01-12 RX ORDER — SULFAMETHOXAZOLE AND TRIMETHOPRIM 800; 160 MG/1; MG/1
800-160 TABLET ORAL TWICE DAILY
Qty: 14 | Refills: 0 | Status: DISCONTINUED | COMMUNITY
Start: 2018-12-14 | End: 2019-01-12

## 2019-01-12 RX ORDER — PANTOPRAZOLE 40 MG/1
40 TABLET, DELAYED RELEASE ORAL DAILY
Qty: 30 | Refills: 0 | Status: DISCONTINUED | COMMUNITY
Start: 2018-12-14 | End: 2019-01-12

## 2019-01-14 ENCOUNTER — CHART COPY (OUTPATIENT)
Age: 84
End: 2019-01-14

## 2019-01-16 NOTE — PHYSICAL EXAM
[No Acute Distress] : no acute distress [Well Nourished] : well nourished [Well Developed] : well developed [Well-Appearing] : well-appearing [Normal Sclera/Conjunctiva] : normal sclera/conjunctiva [Normal Outer Ear/Nose] : the outer ears and nose were normal in appearance [No Respiratory Distress] : no respiratory distress  [Clear to Auscultation] : lungs were clear to auscultation bilaterally [No Accessory Muscle Use] : no accessory muscle use [Normal Rate] : normal rate  [Regular Rhythm] : with a regular rhythm [Normal S1, S2] : normal S1 and S2 [No Murmur] : no murmur heard [No Extremity Clubbing/Cyanosis] : no extremity clubbing/cyanosis [___+] : [unfilled]U+ pretibial pitting edema on the left [2+] : left 2+ [Soft] : abdomen soft [Non Tender] : non-tender [Normal Bowel Sounds] : normal bowel sounds [Grossly Normal Strength/Tone] : grossly normal strength/tone [Dry Skin] : dry skin [Erythema] : erythema [Weathered] : weathered appearance [Normal Gait] : normal gait [Coordination Grossly Intact] : coordination grossly intact [Sensation Tactile Decrease] : light touch was intact [Normal Affect] : the affect was normal [Normal Insight/Judgement] : insight and judgment were intact [de-identified] : Negative homans sign [de-identified] : last bowel movement 1/12/19 [de-identified] : kyphosis [de-identified] : Left knee incision with steri strips in place on distal end. Incision well healing and without erythema, drainage or dehiscence. B/L lower extremities with erythema, not cathi to touch, mild warmth, skin very dry and tough upon palpation on b/l lower calfs.  [de-identified] : walks with walker

## 2019-01-16 NOTE — REVIEW OF SYSTEMS
[Lower Ext Edema] : lower extremity edema [Negative] : Gastrointestinal [Fever] : no fever [Chills] : no chills [Fatigue] : no fatigue [Chest Pain] : no chest pain [Palpitations] : no palpitations [Dysuria] : no dysuria [Joint Pain] : no joint pain [Headache] : no headache [Dizziness] : no dizziness [Unsteady Walking] : no ataxia [de-identified] : bilateral lower extremity erythema

## 2019-01-16 NOTE — HISTORY OF PRESENT ILLNESS
[Friend] : friend [FreeTextEntry1] : Bilateral lower extremity swelling and erythema [de-identified] : Patient called CN/CCC today with c/o bilateral lower extremity swelling and erythema. \par Pt s/p Left TKA on 12/5/18 at [facility] with Dr. Srinivasa Fernandez. \par Pt stated that starting yesterday she noticed worsening erythema and swelling to b/l lower extremities. Erythema on left lower extremity from proximal end of incision to ankles. Erythema on right lower extremity on lower shin down to ankle. Pt states that she has had lower extremity erythema since surgery. Pt states that she called Dr. Fernandez's on call service yesterday afternoon regarding increased swelling and erythema and was instructed to ice and elevate and f/u in office on Monday. \par Pt stating today that since icing and elevating her legs starting yesterday that her erythema and swelling has slightly improved. Endorses warmth to lower extremities, not cathi to touch. Denies erythema, drainage, swelling to left knee incision. Denies calf pain. Denies fever. Doppler study ordered at home for patient today. Awaiting final report.

## 2019-01-16 NOTE — PLAN
[FreeTextEntry1] : Doppler study performed at home today. Awaiting final report. \par Pt instructed to follow up with Dr. Fernandez in the office on 1/14/19 for follow up on lower extremity swelling and erythema. \[place] Pt also states that she will f/u with her PCP and discuss future appointment with vascular physician for possible PVD. \par Instructed pt to call CN/CCC with any new or worsening symptoms.

## 2019-01-18 ENCOUNTER — MOBILE ON CALL (OUTPATIENT)
Age: 84
End: 2019-01-18

## 2019-02-01 ENCOUNTER — APPOINTMENT (OUTPATIENT)
Dept: ORTHOPEDIC SURGERY | Facility: CLINIC | Age: 84
End: 2019-02-01

## 2019-02-20 ENCOUNTER — MOBILE ON CALL (OUTPATIENT)
Age: 84
End: 2019-02-20

## 2019-03-29 ENCOUNTER — APPOINTMENT (OUTPATIENT)
Dept: ORTHOPEDIC SURGERY | Facility: CLINIC | Age: 84
End: 2019-03-29
Payer: MEDICARE

## 2019-03-29 VITALS
HEIGHT: 63 IN | BODY MASS INDEX: 26.58 KG/M2 | HEART RATE: 79 BPM | SYSTOLIC BLOOD PRESSURE: 152 MMHG | WEIGHT: 150 LBS | DIASTOLIC BLOOD PRESSURE: 80 MMHG

## 2019-03-29 DIAGNOSIS — Z96.652 PRESENCE OF LEFT ARTIFICIAL KNEE JOINT: ICD-10-CM

## 2019-03-29 PROCEDURE — 99213 OFFICE O/P EST LOW 20 MIN: CPT

## 2019-03-29 PROCEDURE — 73562 X-RAY EXAM OF KNEE 3: CPT | Mod: LT

## 2019-03-29 NOTE — DISCUSSION/SUMMARY
[Medication Risks Reviewed] : Medication risks reviewed [de-identified] : Patient continuing exercise program with walking, strengthening. The patient would benefit from outpatient physical therapy and it generated a prescription to have her go for modalities. The patient can take anti-inflammatories on a p.r.n. basis for discomfort. All of her questions answered to his satisfaction

## 2019-03-29 NOTE — HISTORY OF PRESENT ILLNESS
[de-identified] : The patient is an 84-year-old female who presents to the top of her left knee. She status post a left total knee replacement on December 5, 2018. We did a right total knee replacement approximately 6 years ago. The patient has finished home physical therapy. She seen today with a rolling walker. She does complain of intermittent swelling and discomfort of the left knee at times. He denies any fevers or chills. She denies instability. [Stable] : stable [7] : a current pain level of 7/10 [4] : an average pain level of 4/10 [2] : a minimum pain level of 2/10 [8] : a maximum pain level of 8/10 [Intermit.] : ~He/She~ states the symptoms seem to be intermittent [Bending] : worsened by bending [Direct Pressure] : worsened by direct pressure [Sitting] : worsened by sitting [Running] : worsened by running [Acetaminophen] : relieved by acetaminophen [Ice] : relieved by ice [Rest] : relieved by rest [Ataxia] : no ataxia [Incontinence] : no incontinence [Loss of Dexterity] : good dexterity [Urinary Ret.] : no urinary retention

## 2019-03-29 NOTE — REASON FOR VISIT
[Follow-Up Visit] : a follow-up visit for [Family Member] : family member [FreeTextEntry2] : left TKR 12/5/19

## 2019-03-29 NOTE — PHYSICAL EXAM
[Antalgic] : antalgic [UE/LE] : Sensory: Intact in bilateral upper & lower extremities [ALL] : dorsalis pedis, posterior tibial, femoral, popliteal, and radial 2+ and symmetric bilaterally [Normal RUE] : Right Upper Extremity: No scars, rashes, lesions, ulcers, skin intact [Normal LUE] : Left Upper Extremity: No scars, rashes, lesions, ulcers, skin intact [Normal RLE] : Right Lower Extremity: No scars, rashes, lesions, ulcers, skin intact [de-identified] : Well-healed surgical scars to the left knee with no erythema, drainage, redness or cellulitis. Less than 5° laxity with varus stress. Negative anterior drawer. Range of motion 0-135. No extension lag. No flexion contractures. Calf is soft, nontender, no evidence of DVT at this time. Patient has good motor and sensory to both upper legs\par \par Range of motion of right knee zero to 1:15. Less than 5° laxity with varus stress. Negative anterior drawer. No extension lag. No flexion contractures. [de-identified] : Right and left total knee replacement, grossly anatomic alignment, good bone to prosthesis interface, is no evidence of prosthetic failure, all 3 components anatomically aligned.

## 2020-11-11 NOTE — H&P PST ADULT - CONSTITUTIONAL
Pt Name: Lindsay May  Pt : 1998  Date: 2020    Visit type: In person    Referral source:      Reason for visit: Consult    Visited with: Patient    Taxonomy:    Â· Intended Effects: Demonstrate caring and concern  Â· Methods: Encourage sharing of feelings  Â· Interventions:  Active listening, Share words of hope and inspiration     Intern Loli Vivar detailed exam

## 2021-02-03 ENCOUNTER — APPOINTMENT (OUTPATIENT)
Dept: OBGYN | Facility: CLINIC | Age: 86
End: 2021-02-03

## 2024-07-08 NOTE — PATIENT PROFILE ADULT - OVER THE PAST TWO WEEKS HAVE YOU FELT DOWN, DEPRESSED OR HOPELESS?
DIAGNOSIS: Left 5th finger fracture, ok to schedule per Elida SHAIKH     APPOINTMENT DATE: 7.8.24   NOTES STATUS DETAILS   OFFICE NOTE from referring provider Internal 6.26.24  Flip Manuel   MEDICATION LIST Internal    XRAYS (IMAGES & REPORTS) Internal *sched* for 7.8.24  XR Finger Left    6.26.24  XR Finger Left            no

## 2025-01-12 NOTE — PROGRESS NOTE ADULT - PROBLEM SELECTOR PROBLEM 2
Lab Results   Component Value Date    CREATININE 2.17 (H) 01/12/2025    CREATININE 1.69 (H) 01/11/2025    CREATININE 1.48 (H) 01/10/2025    EGFR 19 01/12/2025    EGFR 26 01/11/2025    EGFR 30 01/10/2025     Acute kidney injury probably secondary to hypoxia during aspiration and intubation   Chest pain, unspecified type